# Patient Record
Sex: FEMALE | Race: OTHER | NOT HISPANIC OR LATINO
[De-identification: names, ages, dates, MRNs, and addresses within clinical notes are randomized per-mention and may not be internally consistent; named-entity substitution may affect disease eponyms.]

---

## 2020-08-25 ENCOUNTER — TRANSCRIPTION ENCOUNTER (OUTPATIENT)
Age: 55
End: 2020-08-25

## 2020-08-27 ENCOUNTER — APPOINTMENT (OUTPATIENT)
Dept: INTERNAL MEDICINE | Facility: CLINIC | Age: 55
End: 2020-08-27
Payer: MEDICAID

## 2020-08-27 ENCOUNTER — NON-APPOINTMENT (OUTPATIENT)
Age: 55
End: 2020-08-27

## 2020-08-27 ENCOUNTER — LABORATORY RESULT (OUTPATIENT)
Age: 55
End: 2020-08-27

## 2020-08-27 DIAGNOSIS — Z82.3 FAMILY HISTORY OF STROKE: ICD-10-CM

## 2020-08-27 DIAGNOSIS — Z86.69 PERSONAL HISTORY OF OTHER DISEASES OF THE NERVOUS SYSTEM AND SENSE ORGANS: ICD-10-CM

## 2020-08-27 PROCEDURE — 99386 PREV VISIT NEW AGE 40-64: CPT | Mod: 25

## 2020-08-27 PROCEDURE — 99203 OFFICE O/P NEW LOW 30 MIN: CPT | Mod: 25

## 2020-08-27 PROCEDURE — 36415 COLL VENOUS BLD VENIPUNCTURE: CPT

## 2020-08-27 PROCEDURE — 93000 ELECTROCARDIOGRAM COMPLETE: CPT

## 2020-08-30 PROBLEM — Z82.3 FAMILY HISTORY OF CEREBROVASCULAR ACCIDENT (CVA): Status: ACTIVE | Noted: 2020-08-30

## 2020-08-30 PROBLEM — Z86.69 HISTORY OF CARPAL TUNNEL SYNDROME: Status: RESOLVED | Noted: 2020-08-30 | Resolved: 2020-08-30

## 2020-08-30 NOTE — ASSESSMENT
[FreeTextEntry1] : Blood work done at the office, advised to see a gastroenterologist for colonoscopy mammogram ordered physical therapy and diclofenac gel ordered for lateral epicondyle lightest. No significant neurologic deficit.

## 2020-08-30 NOTE — PHYSICAL EXAM
[Normal Appearance] : normal in appearance [No Masses] : no palpable masses [No Axillary Lymphadenopathy] : no axillary lymphadenopathy [No Nipple Discharge] : no nipple discharge [de-identified] : right elbow pain, worse with flexiom [Normal] : affect was normal and insight and judgment were intact

## 2020-08-30 NOTE — HISTORY OF PRESENT ILLNESS
[FreeTextEntry1] : Patient presents for annual physical [de-identified] : Has history of gastritis, has seen gastroenterologist and head in task. Denies nausea vomiting. Denies any abdominal pain. Also has right elbow pain worse with flexion of the elbow. Denies any neck  pain denies any numbness.

## 2020-08-30 NOTE — HEALTH RISK ASSESSMENT
[Good] : ~his/her~ current health as good [No] : No [0] : 2) Feeling down, depressed, or hopeless: Not at all (0) [FreeTextEntry1] : gastritis, elbow pain [de-identified] : none [] : No [de-identified] : GI [MammogramComments] : ordered [ColonoscopyComments] : ordered

## 2020-09-01 LAB
25(OH)D3 SERPL-MCNC: 22.3 NG/ML
ALBUMIN SERPL ELPH-MCNC: 4.6 G/DL
ALP BLD-CCNC: 96 U/L
ALT SERPL-CCNC: 28 U/L
ANION GAP SERPL CALC-SCNC: 14 MMOL/L
APPEARANCE: CLEAR
APPEARANCE: CLEAR
AST SERPL-CCNC: 28 U/L
BACTERIA: NEGATIVE
BASOPHILS # BLD AUTO: 0.03 K/UL
BASOPHILS NFR BLD AUTO: 0.5 %
BILIRUB SERPL-MCNC: 0.9 MG/DL
BILIRUBIN URINE: NEGATIVE
BILIRUBIN URINE: NEGATIVE
BLOOD URINE: ABNORMAL
BLOOD URINE: ABNORMAL
BUN SERPL-MCNC: 17 MG/DL
CALCIUM SERPL-MCNC: 10.1 MG/DL
CHLORIDE SERPL-SCNC: 102 MMOL/L
CHOLEST SERPL-MCNC: 232 MG/DL
CHOLEST/HDLC SERPL: 3.7 RATIO
CO2 SERPL-SCNC: 24 MMOL/L
COLOR: YELLOW
COLOR: YELLOW
CREAT SERPL-MCNC: 0.64 MG/DL
EOSINOPHIL # BLD AUTO: 0.07 K/UL
EOSINOPHIL NFR BLD AUTO: 1.2 %
ESTIMATED AVERAGE GLUCOSE: 120 MG/DL
GLUCOSE QUALITATIVE U: NEGATIVE
GLUCOSE QUALITATIVE U: NEGATIVE
GLUCOSE SERPL-MCNC: 97 MG/DL
HBA1C MFR BLD HPLC: 5.8 %
HCT VFR BLD CALC: 41.8 %
HDLC SERPL-MCNC: 63 MG/DL
HGB BLD-MCNC: 13.2 G/DL
HYALINE CASTS: 0 /LPF
IMM GRANULOCYTES NFR BLD AUTO: 0.3 %
KETONES URINE: NEGATIVE
KETONES URINE: NEGATIVE
LDLC SERPL CALC-MCNC: 148 MG/DL
LEUKOCYTE ESTERASE URINE: ABNORMAL
LEUKOCYTE ESTERASE URINE: ABNORMAL
LYMPHOCYTES # BLD AUTO: 1.76 K/UL
LYMPHOCYTES NFR BLD AUTO: 29.3 %
MAN DIFF?: NORMAL
MCHC RBC-ENTMCNC: 29.9 PG
MCHC RBC-ENTMCNC: 31.6 GM/DL
MCV RBC AUTO: 94.8 FL
MICROSCOPIC-UA: NORMAL
MONOCYTES # BLD AUTO: 0.35 K/UL
MONOCYTES NFR BLD AUTO: 5.8 %
NEUTROPHILS # BLD AUTO: 3.77 K/UL
NEUTROPHILS NFR BLD AUTO: 62.9 %
NITRITE URINE: NEGATIVE
NITRITE URINE: NEGATIVE
PH URINE: 6
PH URINE: 6
PLATELET # BLD AUTO: 262 K/UL
POTASSIUM SERPL-SCNC: 4.1 MMOL/L
PROT SERPL-MCNC: 7 G/DL
PROTEIN URINE: NORMAL
PROTEIN URINE: NORMAL
RBC # BLD: 4.41 M/UL
RBC # FLD: 13.1 %
RED BLOOD CELLS URINE: 12 /HPF
SODIUM SERPL-SCNC: 140 MMOL/L
SPECIFIC GRAVITY URINE: 1.03
SPECIFIC GRAVITY URINE: 1.03
SQUAMOUS EPITHELIAL CELLS: 1 /HPF
TRIGL SERPL-MCNC: 108 MG/DL
TSH SERPL-ACNC: 0.86 UIU/ML
UROBILINOGEN URINE: NORMAL
UROBILINOGEN URINE: NORMAL
WBC # FLD AUTO: 6 K/UL
WHITE BLOOD CELLS URINE: 52 /HPF

## 2020-09-09 ENCOUNTER — LABORATORY RESULT (OUTPATIENT)
Age: 55
End: 2020-09-09

## 2020-09-16 LAB
APPEARANCE: ABNORMAL
APPEARANCE: ABNORMAL
BACTERIA: NEGATIVE
BILIRUBIN URINE: NEGATIVE
BILIRUBIN URINE: NEGATIVE
BLOOD URINE: ABNORMAL
BLOOD URINE: ABNORMAL
COLOR: YELLOW
COLOR: YELLOW
GLUCOSE QUALITATIVE U: NEGATIVE
GLUCOSE QUALITATIVE U: NEGATIVE
HYALINE CASTS: 2 /LPF
KETONES URINE: NEGATIVE
KETONES URINE: NEGATIVE
LEUKOCYTE ESTERASE URINE: ABNORMAL
LEUKOCYTE ESTERASE URINE: ABNORMAL
MICROSCOPIC-UA: NORMAL
NITRITE URINE: NEGATIVE
NITRITE URINE: NEGATIVE
PH URINE: 6
PH URINE: 6
PROTEIN URINE: ABNORMAL
PROTEIN URINE: ABNORMAL
RED BLOOD CELLS URINE: 6 /HPF
SPECIFIC GRAVITY URINE: 1.03
SPECIFIC GRAVITY URINE: 1.03
SQUAMOUS EPITHELIAL CELLS: 3 /HPF
UROBILINOGEN URINE: NORMAL
UROBILINOGEN URINE: NORMAL
WHITE BLOOD CELLS URINE: 30 /HPF

## 2020-10-06 ENCOUNTER — LABORATORY RESULT (OUTPATIENT)
Age: 55
End: 2020-10-06

## 2020-10-14 LAB
APPEARANCE: ABNORMAL
APPEARANCE: ABNORMAL
BACTERIA: ABNORMAL
BILIRUBIN URINE: NEGATIVE
BILIRUBIN URINE: NEGATIVE
BLOOD URINE: ABNORMAL
BLOOD URINE: ABNORMAL
COLOR: YELLOW
COLOR: YELLOW
GLUCOSE QUALITATIVE U: NEGATIVE
GLUCOSE QUALITATIVE U: NEGATIVE
HYALINE CASTS: 1 /LPF
KETONES URINE: NEGATIVE
KETONES URINE: NEGATIVE
LEUKOCYTE ESTERASE URINE: ABNORMAL
LEUKOCYTE ESTERASE URINE: ABNORMAL
MICROSCOPIC-UA: NORMAL
NITRITE URINE: NEGATIVE
NITRITE URINE: NEGATIVE
PH URINE: 5.5
PH URINE: 5.5
PROTEIN URINE: NORMAL
PROTEIN URINE: NORMAL
RED BLOOD CELLS URINE: 6 /HPF
SPECIFIC GRAVITY URINE: 1.03
SPECIFIC GRAVITY URINE: 1.03
SQUAMOUS EPITHELIAL CELLS: 0 /HPF
UROBILINOGEN URINE: NORMAL
UROBILINOGEN URINE: NORMAL
WHITE BLOOD CELLS URINE: 111 /HPF

## 2020-11-18 ENCOUNTER — APPOINTMENT (OUTPATIENT)
Dept: INTERNAL MEDICINE | Facility: CLINIC | Age: 55
End: 2020-11-18
Payer: MEDICAID

## 2020-11-18 VITALS
DIASTOLIC BLOOD PRESSURE: 66 MMHG | BODY MASS INDEX: 29.51 KG/M2 | SYSTOLIC BLOOD PRESSURE: 124 MMHG | OXYGEN SATURATION: 98 % | HEIGHT: 61.81 IN | HEART RATE: 68 BPM | TEMPERATURE: 96.8 F | WEIGHT: 160.37 LBS

## 2020-11-18 DIAGNOSIS — M62.838 OTHER MUSCLE SPASM: ICD-10-CM

## 2020-11-18 PROCEDURE — 99213 OFFICE O/P EST LOW 20 MIN: CPT

## 2020-11-18 RX ORDER — DICLOFENAC SODIUM 10 MG/G
1 GEL TOPICAL
Qty: 1 | Refills: 3 | Status: DISCONTINUED | COMMUNITY
Start: 2020-08-27 | End: 2020-11-18

## 2020-11-22 NOTE — HISTORY OF PRESENT ILLNESS
[FreeTextEntry8] : Patient presents to the office, has right shoulder and neck pain. Pain travels to the anterior shoulder, denies any weakness, numbness, headache. Worse with movement.

## 2020-11-22 NOTE — PHYSICAL EXAM
[Normal] : normal gait, coordination grossly intact, no focal deficits and deep tendon reflexes were 2+ and symmetric [de-identified] : restricted ROM of the right shoulder, neck paraspinal muscle spasm

## 2020-12-23 ENCOUNTER — LABORATORY RESULT (OUTPATIENT)
Age: 55
End: 2020-12-23

## 2020-12-23 ENCOUNTER — APPOINTMENT (OUTPATIENT)
Dept: INTERNAL MEDICINE | Facility: CLINIC | Age: 55
End: 2020-12-23
Payer: MEDICAID

## 2020-12-23 VITALS
OXYGEN SATURATION: 96 % | HEART RATE: 72 BPM | RESPIRATION RATE: 14 BRPM | TEMPERATURE: 97.4 F | DIASTOLIC BLOOD PRESSURE: 86 MMHG | SYSTOLIC BLOOD PRESSURE: 124 MMHG

## 2020-12-23 VITALS
BODY MASS INDEX: 29.02 KG/M2 | SYSTOLIC BLOOD PRESSURE: 105 MMHG | TEMPERATURE: 98.4 F | OXYGEN SATURATION: 98 % | WEIGHT: 157.71 LBS | DIASTOLIC BLOOD PRESSURE: 62 MMHG | HEIGHT: 61.81 IN | HEART RATE: 65 BPM

## 2020-12-23 DIAGNOSIS — M77.11 LATERAL EPICONDYLITIS, RIGHT ELBOW: ICD-10-CM

## 2020-12-23 DIAGNOSIS — R31.29 OTHER MICROSCOPIC HEMATURIA: ICD-10-CM

## 2020-12-23 DIAGNOSIS — R10.13 EPIGASTRIC PAIN: ICD-10-CM

## 2020-12-23 PROCEDURE — 36415 COLL VENOUS BLD VENIPUNCTURE: CPT

## 2020-12-23 PROCEDURE — 99072 ADDL SUPL MATRL&STAF TM PHE: CPT

## 2020-12-23 PROCEDURE — 99214 OFFICE O/P EST MOD 30 MIN: CPT | Mod: 25

## 2020-12-23 RX ORDER — CEFUROXIME AXETIL 500 MG/1
500 TABLET ORAL
Qty: 14 | Refills: 0 | Status: DISCONTINUED | COMMUNITY
Start: 2020-09-16 | End: 2020-12-23

## 2020-12-23 NOTE — HISTORY OF PRESENT ILLNESS
[FreeTextEntry1] : Patient presents for follow-up of chronic disease management [de-identified] : Feels well however does have some epigastric pain advised heartburn omeprazole helps his symptoms, denies any dysphagia odynophagia melena nausea vomiting decreased appetite.  Denies any family history of gastric cancer.  Been attempting to watch diet has not gone for mammogram denies any urinary urgency flank pain gross hematuria.

## 2020-12-23 NOTE — PHYSICAL EXAM
[Normal Appearance] : normal in appearance [No Masses] : no palpable masses [No Nipple Discharge] : no nipple discharge [No Axillary Lymphadenopathy] : no axillary lymphadenopathy [Normal] : normal gait, coordination grossly intact, no focal deficits and deep tendon reflexes were 2+ and symmetric

## 2020-12-23 NOTE — ASSESSMENT
[FreeTextEntry1] : Advised diclofenac gel for lateral epicondylitis, check lipid panel for dyspepsia advised to see gastroenterologist advised to follow-up check UA and culture for microscopic hematuria.

## 2020-12-30 LAB
25(OH)D3 SERPL-MCNC: 20.1 NG/ML
ALBUMIN SERPL ELPH-MCNC: 4.5 G/DL
ALP BLD-CCNC: 107 U/L
ALT SERPL-CCNC: 24 U/L
ANION GAP SERPL CALC-SCNC: 13 MMOL/L
APPEARANCE: CLEAR
APPEARANCE: CLEAR
AST SERPL-CCNC: 26 U/L
BACTERIA: ABNORMAL
BASOPHILS # BLD AUTO: 0.04 K/UL
BASOPHILS NFR BLD AUTO: 0.8 %
BILIRUB SERPL-MCNC: 0.8 MG/DL
BILIRUBIN URINE: NEGATIVE
BILIRUBIN URINE: NEGATIVE
BLOOD URINE: ABNORMAL
BLOOD URINE: ABNORMAL
BUN SERPL-MCNC: 16 MG/DL
CALCIUM SERPL-MCNC: 9.7 MG/DL
CHLORIDE SERPL-SCNC: 104 MMOL/L
CHOLEST SERPL-MCNC: 237 MG/DL
CO2 SERPL-SCNC: 24 MMOL/L
COLOR: YELLOW
COLOR: YELLOW
CREAT SERPL-MCNC: 0.8 MG/DL
EOSINOPHIL # BLD AUTO: 0.05 K/UL
EOSINOPHIL NFR BLD AUTO: 1 %
ESTIMATED AVERAGE GLUCOSE: 128 MG/DL
GLUCOSE QUALITATIVE U: NEGATIVE
GLUCOSE QUALITATIVE U: NEGATIVE
GLUCOSE SERPL-MCNC: 94 MG/DL
HBA1C MFR BLD HPLC: 6.1 %
HCT VFR BLD CALC: 41.5 %
HDLC SERPL-MCNC: 60 MG/DL
HGB BLD-MCNC: 12.8 G/DL
HYALINE CASTS: 2 /LPF
IMM GRANULOCYTES NFR BLD AUTO: 0.2 %
KETONES URINE: NEGATIVE
KETONES URINE: NEGATIVE
LDLC SERPL CALC-MCNC: 155 MG/DL
LEUKOCYTE ESTERASE URINE: ABNORMAL
LEUKOCYTE ESTERASE URINE: ABNORMAL
LYMPHOCYTES # BLD AUTO: 1.7 K/UL
LYMPHOCYTES NFR BLD AUTO: 35.5 %
MAN DIFF?: NORMAL
MCHC RBC-ENTMCNC: 29.8 PG
MCHC RBC-ENTMCNC: 30.8 GM/DL
MCV RBC AUTO: 96.5 FL
MICROSCOPIC-UA: NORMAL
MONOCYTES # BLD AUTO: 0.29 K/UL
MONOCYTES NFR BLD AUTO: 6.1 %
NEUTROPHILS # BLD AUTO: 2.7 K/UL
NEUTROPHILS NFR BLD AUTO: 56.4 %
NITRITE URINE: POSITIVE
NITRITE URINE: POSITIVE
NONHDLC SERPL-MCNC: 177 MG/DL
PH URINE: 5.5
PH URINE: 5.5
PLATELET # BLD AUTO: 295 K/UL
POTASSIUM SERPL-SCNC: 4.2 MMOL/L
PROT SERPL-MCNC: 7.5 G/DL
PROTEIN URINE: NORMAL
PROTEIN URINE: NORMAL
RBC # BLD: 4.3 M/UL
RBC # FLD: 13.5 %
RED BLOOD CELLS URINE: 2 /HPF
SODIUM SERPL-SCNC: 141 MMOL/L
SPECIFIC GRAVITY URINE: 1.02
SPECIFIC GRAVITY URINE: 1.02
SQUAMOUS EPITHELIAL CELLS: 3 /HPF
TRIGL SERPL-MCNC: 109 MG/DL
TSH SERPL-ACNC: 0.52 UIU/ML
UROBILINOGEN URINE: NORMAL
UROBILINOGEN URINE: NORMAL
WBC # FLD AUTO: 4.79 K/UL
WHITE BLOOD CELLS URINE: 15 /HPF

## 2020-12-31 ENCOUNTER — NON-APPOINTMENT (OUTPATIENT)
Age: 55
End: 2020-12-31

## 2021-02-16 ENCOUNTER — EMERGENCY (EMERGENCY)
Facility: HOSPITAL | Age: 56
LOS: 1 days | Discharge: HOME | End: 2021-02-16
Attending: EMERGENCY MEDICINE
Payer: MEDICAID

## 2021-02-16 VITALS
HEART RATE: 82 BPM | SYSTOLIC BLOOD PRESSURE: 138 MMHG | RESPIRATION RATE: 16 BRPM | TEMPERATURE: 99 F | DIASTOLIC BLOOD PRESSURE: 69 MMHG | OXYGEN SATURATION: 98 % | HEIGHT: 62 IN | WEIGHT: 162.92 LBS

## 2021-02-16 PROCEDURE — 99283 EMERGENCY DEPT VISIT LOW MDM: CPT

## 2021-02-16 RX ORDER — CYCLOBENZAPRINE HYDROCHLORIDE 10 MG/1
1 TABLET, FILM COATED ORAL
Qty: 15 | Refills: 0
Start: 2021-02-16 | End: 2021-02-20

## 2021-02-16 RX ORDER — IBUPROFEN 200 MG
600 TABLET ORAL ONCE
Refills: 0 | Status: COMPLETED | OUTPATIENT
Start: 2021-02-16 | End: 2021-02-16

## 2021-02-16 RX ORDER — ACETAMINOPHEN 500 MG
975 TABLET ORAL ONCE
Refills: 0 | Status: COMPLETED | OUTPATIENT
Start: 2021-02-16 | End: 2021-02-16

## 2021-02-16 RX ORDER — LIDOCAINE 4 G/100G
1 CREAM TOPICAL ONCE
Refills: 0 | Status: COMPLETED | OUTPATIENT
Start: 2021-02-16 | End: 2021-02-16

## 2021-02-16 RX ADMIN — Medication 975 MILLIGRAM(S): at 19:27

## 2021-02-16 RX ADMIN — Medication 600 MILLIGRAM(S): at 19:27

## 2021-02-16 NOTE — ED PROVIDER NOTE - CLINICAL SUMMARY MEDICAL DECISION MAKING FREE TEXT BOX
Exam c/w MSK pain 2/2 cervical strain. No mid-line CS TTP and no trauma. Supportive care discussed using . Cervical collar provided for comfort.

## 2021-02-16 NOTE — ED PROVIDER NOTE - RAPID ASSESSMENT
55 F presents with nontraumatic neck pain x5 days radiating to back worsening today. Pt is unable to move neck or rotate it. States unable to lift arm up. Denies headache, or fever.     Scribe Statement: I, Pam Kelley, attest that this documentation has been prepared under the direction and in the presence of Genaro Springer (PA) 55 F presents with nontraumatic R neck pain x5 days. Thinks she slept on it wrong because she woke up with the pain. [place] most in R side of neck and radiates to R upper back and shoulder, worsened w/ movement of arm. Able to rotate neck but reports pain when doing so. Denies headache, fever, numbness/tingling, weakness in arm, trauma/injury.    Scribe Statement: I, Pam Kelley, attest that this documentation has been prepared under the direction and in the presence of Genaro Springer (PA)    Genaro Springer PA-C: The scribe's documentation has been prepared under my direction and personally reviewed by me in its entirety. I confirm that the note above accurately reflects history obtained.   Patient was rapidly assessed via telemedicine encounter; a limited history was obtained. The patient will be seen and further examined and worked up in the main ED and their care will be completed by the main ED team. Receiving team will follow up on labs, analgesia, any clinical imaging, and perform reassessment and disposition of the patient as clinically indicated. All decisions regarding the progression of care will be made at their discretion.

## 2021-02-16 NOTE — ED PROVIDER NOTE - NSFOLLOWUPINSTRUCTIONS_ED_ALL_ED_FT
Follow-up with your primary care doctor in 3-7 days. Call office for appointment.    Take Extra Strength Tylenol over the counter per label instructions as needed for pain.    Take Flexeril (muscle relaxer) per prescription.    Wear neck brace as needed for comfort.    Apply warm pack to neck as needed for pain.    Gently stretch neck and arm.    Seek medical attention if your symptoms worsen or you have any concerns.  ------------------------------------------------------------------------------------------------------------------------  [employer] :    Jama un seguimiento con bobby médico de atención primaria en 3-7 días. Llame a la oficina para hacer jackie bonnie.    Keller Tylenol Extra Strength de venta shakila según las instrucciones de la etiqueta según sea necesario para el dolor.    Keller Flexeril (relajante muscular) según prescripción médica.    Use un collarín según sea necesario para mayor comodidad.    Aplique jackie compresa caliente en el cosme según sea necesario para el dolor.    Estire suavemente el cosme y el brazo.    Busque atención médica si ramakrishna síntomas empeoran o si tiene alguna inquietud.

## 2021-02-16 NOTE — ED PROVIDER NOTE - PATIENT PORTAL LINK FT
You can access the FollowMyHealth Patient Portal offered by [facility] by registering at the following website: [email or web address]. By joining [facility]’s FollowMyHealth portal, you will also be able to view your health information using other applications (apps) compatible with our system.

## 2021-02-16 NOTE — ED PROVIDER NOTE - OBJECTIVE STATEMENT
55F PMH of gastritis, HL, carpel tunnel syndrome and RUE tendonitis p/w a few days gradual onset worsening right side neck pain radiating into RUE, worse with turning head and lifting up arm. No medication or intervention PTA. No HA, no CP, no trauma. Pt is right handed. Currently not employed.

## 2021-02-16 NOTE — ED PROVIDER NOTE - NS ED ROS FT
Subjective   Patient ID: Merrill Wright is a 54year old female. Chief Complaint   Patient presents with   [facility] F/U     [facility]  [place]  Â   ADMISSION DATE:   11/29/2018  Â   DISCHARGE DATE:   12/2/2018  Â LE weakness with MS          Patient's medications, allergies, past medical, surgical, social and family histories were reviewed and updated as appropriate. Review of Systems   All other systems reviewed and are negative.       Objective   Physical Exam    Assessment   Problem List Items Addressed This Visit        Nervous    Multiple sclerosis (CMS/[facility]) - Primary     Low carb diet discussed            Musculoskeletal    Facet arthritis of lumbar region (CMS/[facility])     PT and Neuro  Neurosurgery if recommended by neurology CONSTITUTIONAL: Well appearing, well nourished, awake, alert, oriented to person, place, time/situation and in no apparent distress  ENMT: Airway patent  EYES: Clear bilaterally  CARDIAC: Normal rate, regular rhythm.  RESPIRATORY: Breath sounds clear and equal bilaterally.  ABDOMEN: Abdomen soft, non-tender, no guarding  MUSCULOSKELETAL: Spine appears normal, no deformities, No mid-line CS TTP, +TTP right lateral neck base to mid-shoulder, +5/5 b/l  strength, pain worse with elevation of arm active above head but FROM, radial pulse +2  NEUROLOGIC: CN II-XII grossly intact, moves all extremities without lateralization  SKIN: Exposed skin normal color for race, warm, dry and intact

## 2021-02-16 NOTE — ED PROVIDER NOTE - DISPOSITION TYPE
Chief Complaint   Patient presents with     RECHECK     3 month follow up foot care            Allergies   Allergen Reactions     Penicillins Rash     Unasyn Rash     No evidence SJS, but very uncomfortable and precipitated multiple provider visits. Would not use penicillins again if other options available.          Subjective: Supriya is a 69 year old female who presents to the clinic today for a diabetic foot exam and management. She relates that she has No new foot complaints.      Objective    Hemoglobin A1C   Date Value Ref Range Status   06/22/2018 6.0 (H) 0 - 5.6 % Final     Comment:     Normal <5.7% Prediabetes 5.7-6.4%  Diabetes 6.5% or higher - adopted from ADA   consensus guidelines.           Non-palpable DP and PT pulses BL.   Equinus noted BL. Pes planus with rigid toe deformities noted to lesser digits on the right. Left AKA noted.   Nails are thickened, discolored, elongated, with subungual debris consistent with onychomycosis.    Scabbed venous ulcer on the anterior right leg. No s/s of infection.  No open lesion associated. No bleeding. No pain to the area. Small scar noted to the plantar right 1st interspace. No s/s of infection. No hyperkeratosis. Irritation erythema to the dorsal right foot.      Assessment: DM2 with left AKA and neuropathy - presenting for a diabetic foot exam.   Onychomycosis.      Plan:   - Pt seen and evaluated  - Nails debrided x 5.  - Cont compression socks.  - See again in 3 months.     DISCHARGE

## 2021-02-16 NOTE — ED PROVIDER NOTE - PHYSICAL EXAMINATION
CONSTITUTIONAL: Well appearing, well nourished, awake, alert, oriented to person, place, time/situation and in no apparent distress  ENMT: Airway patent  EYES: Clear bilaterally  CARDIAC: Normal rate, regular rhythm.  RESPIRATORY: Breath sounds clear and equal bilaterally.  ABDOMEN: Abdomen soft, non-tender, no guarding  MUSCULOSKELETAL: Spine appears normal, no deformities, No mid-line CS TTP, +TTP right lateral neck base to mid-shoulder, +5/5 b/l  strength, pain worse with elevation of arm active above head but FROM, radial pulse +2  NEUROLOGIC: CN II-XII grossly intact, moves all extremities without lateralization  SKIN: Exposed skin normal color for race, warm, dry and intact

## 2021-02-17 VITALS
OXYGEN SATURATION: 96 % | RESPIRATION RATE: 16 BRPM | SYSTOLIC BLOOD PRESSURE: 108 MMHG | HEART RATE: 68 BPM | DIASTOLIC BLOOD PRESSURE: 68 MMHG | TEMPERATURE: 98 F

## 2021-02-17 RX ADMIN — LIDOCAINE 1 PATCH: 4 CREAM TOPICAL at 00:05

## 2021-02-17 NOTE — ED ADULT NURSE NOTE - OBJECTIVE STATEMENT
55y female from triage complaining of neck pain, more right sided than left, able to rotate head and move all extremities, pt thinks they slept on it wrong, denies any other symptoms, breathing even and unlabored, bed in lowest position, comfort and safety provided.

## 2021-02-18 ENCOUNTER — APPOINTMENT (OUTPATIENT)
Dept: INTERNAL MEDICINE | Facility: CLINIC | Age: 56
End: 2021-02-18
Payer: MEDICAID

## 2021-02-18 VITALS
TEMPERATURE: 98.4 F | OXYGEN SATURATION: 98 % | HEIGHT: 61.02 IN | BODY MASS INDEX: 30.88 KG/M2 | HEART RATE: 72 BPM | DIASTOLIC BLOOD PRESSURE: 72 MMHG | SYSTOLIC BLOOD PRESSURE: 114 MMHG | WEIGHT: 163.55 LBS

## 2021-02-18 DIAGNOSIS — M25.519 CERVICALGIA: ICD-10-CM

## 2021-02-18 DIAGNOSIS — M54.2 CERVICALGIA: ICD-10-CM

## 2021-02-18 PROCEDURE — 99213 OFFICE O/P EST LOW 20 MIN: CPT

## 2021-02-18 PROCEDURE — 99072 ADDL SUPL MATRL&STAF TM PHE: CPT

## 2021-02-18 RX ORDER — DICLOFENAC SODIUM 1% 10 MG/G
1 GEL TOPICAL DAILY
Qty: 1 | Refills: 0 | Status: ACTIVE | COMMUNITY
Start: 2020-11-18 | End: 1900-01-01

## 2021-02-18 NOTE — HISTORY OF PRESENT ILLNESS
[de-identified] : Symptoms began a few days ago, approximately 2 days ago patient had woken up with stiffness in the right neck.  Had difficulty moving neck went to the emergency room and was diagnosed with muscle spasm.  Has pain in the right shoulder, radiates from the neck down to the right shoulder right elbow.  Has difficulty moving shoulder.  Denies any weakness of the hands denies dropping any objects numbness.  Denies any headaches did fall on outstretched hand in the right arm in August, improved with physical therapy however has not worsened.  Pain is described as sharp in nature.  [FreeTextEntry1] : Patient presents to the office for neck and shoulder pain

## 2021-02-18 NOTE — PHYSICAL EXAM
[Normal] : no posterior cervical lymphadenopathy and no anterior cervical lymphadenopathy [No Focal Deficits] : no focal deficits [Normal Gait] : normal gait [Deep Tendon Reflexes (DTR)] : deep tendon reflexes were 2+ and symmetric [Plantar Reflex Right Only] : absent on the right [de-identified] : Tenderness in the posterior neck anterior and lateral shoulder tenderness to palpation restricted range of motion paraspinal muscle spasm, restricted abduction flexion internal and external rotation.

## 2021-02-18 NOTE — ASSESSMENT
[FreeTextEntry1] : Symptoms may be secondary to neck arthritis muscle spasm radiculopathy versus tendinitis to treat with high-dose prednisone for 1 week if no improvement to see orthopedist.  No signs of any neurologic damage on exam.

## 2021-03-27 ENCOUNTER — APPOINTMENT (OUTPATIENT)
Dept: RADIOLOGY | Facility: CLINIC | Age: 56
End: 2021-03-27
Payer: MEDICAID

## 2021-03-27 ENCOUNTER — OUTPATIENT (OUTPATIENT)
Dept: OUTPATIENT SERVICES | Facility: HOSPITAL | Age: 56
LOS: 1 days | End: 2021-03-27
Payer: MEDICAID

## 2021-03-27 ENCOUNTER — RESULT REVIEW (OUTPATIENT)
Age: 56
End: 2021-03-27

## 2021-03-27 DIAGNOSIS — M54.2 CERVICALGIA: ICD-10-CM

## 2021-03-27 PROCEDURE — 73030 X-RAY EXAM OF SHOULDER: CPT | Mod: 26,RT

## 2021-03-27 PROCEDURE — 72050 X-RAY EXAM NECK SPINE 4/5VWS: CPT

## 2021-03-27 PROCEDURE — 73030 X-RAY EXAM OF SHOULDER: CPT

## 2021-03-27 PROCEDURE — 72050 X-RAY EXAM NECK SPINE 4/5VWS: CPT | Mod: 26

## 2021-03-30 ENCOUNTER — NON-APPOINTMENT (OUTPATIENT)
Age: 56
End: 2021-03-30

## 2021-04-01 ENCOUNTER — APPOINTMENT (OUTPATIENT)
Dept: INTERNAL MEDICINE | Facility: CLINIC | Age: 56
End: 2021-04-01
Payer: MEDICAID

## 2021-04-01 VITALS
HEART RATE: 71 BPM | OXYGEN SATURATION: 96 % | WEIGHT: 166.54 LBS | DIASTOLIC BLOOD PRESSURE: 62 MMHG | BODY MASS INDEX: 31.04 KG/M2 | TEMPERATURE: 99.3 F | HEIGHT: 61.42 IN | SYSTOLIC BLOOD PRESSURE: 100 MMHG

## 2021-04-01 DIAGNOSIS — M25.511 PAIN IN RIGHT SHOULDER: ICD-10-CM

## 2021-04-01 DIAGNOSIS — M79.18 MYALGIA, OTHER SITE: ICD-10-CM

## 2021-04-01 DIAGNOSIS — G89.29 PAIN IN RIGHT SHOULDER: ICD-10-CM

## 2021-04-01 DIAGNOSIS — R79.89 OTHER SPECIFIED ABNORMAL FINDINGS OF BLOOD CHEMISTRY: ICD-10-CM

## 2021-04-01 PROCEDURE — 99072 ADDL SUPL MATRL&STAF TM PHE: CPT

## 2021-04-01 PROCEDURE — 99214 OFFICE O/P EST MOD 30 MIN: CPT

## 2021-04-01 RX ORDER — TIZANIDINE 4 MG/1
4 TABLET ORAL
Qty: 30 | Refills: 1 | Status: ACTIVE | COMMUNITY
Start: 2021-04-01 | End: 1900-01-01

## 2021-04-01 RX ORDER — ERGOCALCIFEROL (VITAMIN D2) 50 MCG
50 MCG CAPSULE ORAL
Qty: 90 | Refills: 3 | Status: ACTIVE | COMMUNITY
Start: 2021-04-01 | End: 1900-01-01

## 2021-04-01 NOTE — HISTORY OF PRESENT ILLNESS
[FreeTextEntry1] : Patient presents to the office for follow-up of shoulder and neck pain [de-identified] : Continues to have neck pain and shoulder pain physical therapy has not alleviated symptoms.  Has difficulty with movement of shoulder also has some neck pain symptoms to radiate down to the right deltoid.  Denies any weakness of the hands.  Denies any numbness of the hands.

## 2021-04-01 NOTE — PHYSICAL EXAM
[Normal] : normal gait, coordination grossly intact, no focal deficits and deep tendon reflexes were 2+ and symmetric [de-identified] : Limited internal and external rotation, limited flexion of the shoulder myofascial tenderness over the right posterior shoulder.  And paraspinal muscle spasm.

## 2021-04-01 NOTE — ASSESSMENT
[FreeTextEntry1] : Symptoms may be secondary to myofascial pain patient does have restricted range of motion suspect an element of neck involved as well neurologic exam within normal limits orthopedic surgery referral placed for further recommendations, also advised to get MRI of the shoulder to rule out any rotator cuff pathology, patient has not benefited from physical therapy

## 2021-04-23 ENCOUNTER — APPOINTMENT (OUTPATIENT)
Dept: MRI IMAGING | Facility: CLINIC | Age: 56
End: 2021-04-23
Payer: MEDICAID

## 2021-04-23 ENCOUNTER — OUTPATIENT (OUTPATIENT)
Dept: OUTPATIENT SERVICES | Facility: HOSPITAL | Age: 56
LOS: 1 days | End: 2021-04-23
Payer: MEDICAID

## 2021-04-23 DIAGNOSIS — M25.511 PAIN IN RIGHT SHOULDER: ICD-10-CM

## 2021-04-23 DIAGNOSIS — M75.80 OTHER SHOULDER LESIONS, UNSPECIFIED SHOULDER: ICD-10-CM

## 2021-04-23 PROCEDURE — 73221 MRI JOINT UPR EXTREM W/O DYE: CPT | Mod: 26,RT

## 2021-04-23 PROCEDURE — 73221 MRI JOINT UPR EXTREM W/O DYE: CPT

## 2021-06-08 ENCOUNTER — APPOINTMENT (OUTPATIENT)
Dept: INTERNAL MEDICINE | Facility: CLINIC | Age: 56
End: 2021-06-08
Payer: MEDICAID

## 2021-06-08 VITALS
BODY MASS INDEX: 31.47 KG/M2 | DIASTOLIC BLOOD PRESSURE: 67 MMHG | WEIGHT: 168.86 LBS | SYSTOLIC BLOOD PRESSURE: 110 MMHG | HEIGHT: 61.54 IN | HEART RATE: 73 BPM | TEMPERATURE: 97.8 F | OXYGEN SATURATION: 98 %

## 2021-06-08 PROCEDURE — 99213 OFFICE O/P EST LOW 20 MIN: CPT

## 2021-06-11 NOTE — ASSESSMENT
[FreeTextEntry1] : Your MRI report which showed tendinosis patient possibly has an element of frozen shoulder as well NSAID given advised to follow-up with orthopedic surgeon as well.  Discussed that would likely benefit from physical therapy.  Could possibly be an element of radiculopathy as well.

## 2021-06-11 NOTE — PHYSICAL EXAM
[Normal] : normal sclera/conjunctiva, pupils are equal, round and reactive to light and extraocular movements are intact [de-identified] : Tenderness to palpation of the subacromial bursa and also posterior shoulder.  Range of motion passive and active limited.

## 2021-06-11 NOTE — HISTORY OF PRESENT ILLNESS
[FreeTextEntry1] : Patient presents to the office for continuation of shoulder pain [de-identified] : Patient continues to have shoulder pain has not gone to physical therapy, has difficulty raising shoulder above the head denies any neck pain.  Denies any weakness of the shoulder.

## 2021-06-15 ENCOUNTER — APPOINTMENT (OUTPATIENT)
Dept: ORTHOPEDIC SURGERY | Facility: CLINIC | Age: 56
End: 2021-06-15
Payer: MEDICAID

## 2021-06-15 VITALS
SYSTOLIC BLOOD PRESSURE: 121 MMHG | HEIGHT: 60 IN | HEART RATE: 71 BPM | WEIGHT: 172 LBS | BODY MASS INDEX: 33.77 KG/M2 | DIASTOLIC BLOOD PRESSURE: 80 MMHG

## 2021-06-15 PROCEDURE — 20610 DRAIN/INJ JOINT/BURSA W/O US: CPT | Mod: RT

## 2021-06-15 PROCEDURE — 99203 OFFICE O/P NEW LOW 30 MIN: CPT | Mod: 25

## 2021-06-15 PROCEDURE — 73030 X-RAY EXAM OF SHOULDER: CPT | Mod: RT

## 2021-06-16 NOTE — PROCEDURE
[de-identified] : Injection: Right shoulder (Subacromial).\par Indication: Adhesive capsulitis  . \par \par A discussion was had with the patient regarding this procedure and all questions were answered. All risks, benefits and alternatives were discussed. These included but were not limited to bleeding, infection, and allergic reaction. Alcohol was used to clean the skin, and betadine was used to sterilize and prep the area in the posterior aspect of the right shoulder. Ethyl chloride spray was then used as a topical anesthetic. A 21-gauge needle was used to inject 4cc of 1% lidocaine and 1cc of 40mg/ml methylprednisolone into the right subacromial space. A sterile bandage was then applied. The patient tolerated the procedure well and there were no complications.

## 2021-06-16 NOTE — PHYSICAL EXAM
[de-identified] : Oriented to time, place, person\par Mood: Normal\par Affect: Normal\par Appearance: Healthy, well appearing, no acute distress.\par Gait: Normal\par Assistive Devices: None\par \par Right shoulder exam:\par \par Inspection: No malalignment, No defects, No atrophy\par Skin: No masses, No lesions\par Neck: Negative Spurling, full ROM, no pain with ROM\par AROM: FF to 120, abduction to 80, ER to 20, IR to lower lumbar\par Painful arc ROM: Pain FF pasive=activ \par Tenderness: No bicipital tenderness, no tenderness to greater tuberosity/RTC insertion, no anterior shoulder/lesser tuberosity tenderness\par Strength: 5/5 ER, 5/5 IR in adduction, 5/5 supraspinatus testing, negative Mongo's test\par AC joint: No TTP/pain with cross arm testing\par Biceps: Speed Negative, Yergason Negative \par Impingement test: Negative Greene, Negative Neer\par Vasc: 2+ radial pulse \par Stability: Stable \par Neuro: AIN, PIN, Ulnar nerve intact to motor\par Sensation: Intact to light touch throughout  [de-identified] : Images were reviewed from NH dated 3.27.2021.\par \par 3 views of right shoulder were obtained, that show no acute fracture or dislocation. There is no glenohumeral and no AC joint degenerative change seen. Type II acromion. There is no significant malalignment. No significant other obvious osseous abnormality, otherwise unremarkable. \par \par MRI right shoulder dated 4.27.2021 shows mild to moderate insertional supraspinatus and infraspinatus tendinosis with no high-grade RTC injury.  No significant internal derangement.

## 2021-06-16 NOTE — ADDENDUM
[FreeTextEntry1] : This note was written by Ani Montano on 06/15/2021 acting solely as a scribe for Dr. Rik Peace.\par \par All medical record entries made by the Scribe were at my, Dr. Rik Peace, direction and personally dictated by me on 06/15/2021. I have personally reviewed the chart and agree that the record accurately reflects my personal performance of the history, physical exam, assessment and plan.

## 2021-06-16 NOTE — HISTORY OF PRESENT ILLNESS
[de-identified] : 55 year old RHD female presents today with right shoulder pain x 1 year. No injury reported. She was referred for PT through PMD completed in February 2021 and it was not helpful. The pain is constant worse with overhead movements and internal rotation. Localizes pain to the lateral shoulder. Reports loss of motion. Diclofenac gel is not helpful. Denies numbness or tingling. She has obtained MRI at [facility].\par \par The patient's past medical history, past surgical history, medications and allergies were reviewed by me today with the patient and documented accordingly. In addition, the patient's family and social history, which were noncontributory to this visit, were reviewed also.

## 2021-06-16 NOTE — DISCUSSION/SUMMARY
[de-identified] : 54 y/o female with right shoulder adhesive capsulitis. \par \par The patient presents today with a painful gradual loss of active and passive glenohumeral motion. This is likely due to progressive fibrosis and contracture of the glenohumeral joint. This is referred to as adhesive capsulitis or "frozen shoulder". I discussed that this occurs in approximately 2-5% of the population, and can affects the contralateral shoulder in approximately 20-30% of patients. This can be due to a primary idiopathic condition, or because of a secondary underlying structural condition. I discussed the 4 stages of adhesive capsulitis. Stage I refers to an inflammatory condition that causes night pain/discomfort with associated full passive ROM. Stage II results in severe pain and restriction of motion during a hyper-inflammatory synovitis. Stage III results in profound tethered stiffness throughout range of motion without acute inflammatory changes. Stage IV results in stiffness with minimal residual pain and dysfunction.I discussed that the natural history of the condition is self-limiting however, this may take up to 12-24 months. Nonoperative treatment includes pharmacological treatment of the inflammation (including NSAID's, intra-articular corticosteroids) and aggressive physical therapy (if tolerated), surgery can address capsular contraction with release/manipulation under anesthesia.\par \par Recommendation: Injection therapy was provided for therapeutic and symptomatic relief. Begin trial of PT, Rx given. NSAIDs/Ice as needed. \par \par Follow up 3 months

## 2021-06-16 NOTE — PATIENT INSTRUCTIONS
[FreeTextEntry1] : This note was written by Ani Montano on 06/15/2021 acting solely as a scribe for Dr. Rik Peace.  All medical record entries made by the Scribe were at my, Dr. Rik Peace, direction and personally dictated by me on 06/15/2021. I have personally reviewed the chart and agree that the record accurately reflects my personal performance of the history, physical exam, assessment and plan.

## 2021-07-02 ENCOUNTER — RX RENEWAL (OUTPATIENT)
Age: 56
End: 2021-07-02

## 2021-07-02 RX ORDER — DICLOFENAC SODIUM 100 MG/1
100 TABLET, FILM COATED, EXTENDED RELEASE ORAL
Qty: 30 | Refills: 0 | Status: ACTIVE | COMMUNITY
Start: 2021-06-08 | End: 1900-01-01

## 2021-08-11 ENCOUNTER — APPOINTMENT (OUTPATIENT)
Dept: ORTHOPEDIC SURGERY | Facility: CLINIC | Age: 56
End: 2021-08-11
Payer: MEDICAID

## 2021-08-11 ENCOUNTER — APPOINTMENT (OUTPATIENT)
Dept: ORTHOPEDIC SURGERY | Facility: CLINIC | Age: 56
End: 2021-08-11

## 2021-08-11 PROCEDURE — 99213 OFFICE O/P EST LOW 20 MIN: CPT

## 2021-08-11 RX ORDER — DICLOFENAC SODIUM 75 MG/1
75 TABLET, DELAYED RELEASE ORAL TWICE DAILY
Qty: 60 | Refills: 0 | Status: ACTIVE | COMMUNITY
Start: 2021-08-11 | End: 1900-01-01

## 2021-08-11 RX ORDER — CYCLOBENZAPRINE HYDROCHLORIDE 5 MG/1
5 TABLET, FILM COATED ORAL
Qty: 30 | Refills: 0 | Status: ACTIVE | COMMUNITY
Start: 2021-08-11 | End: 1900-01-01

## 2021-08-12 NOTE — DISCUSSION/SUMMARY
[de-identified] : 54 y/o female with right shoulder adhesive capsulitis. \par \par The patient presents with follow-up right shoulder adhesive capsulitis. She reports worsening pain and loss of motion to the right shoulder despite trial of PT. PT is aggravating the pain, and she is not tolerating it well.  We discussed discontinuation of physical therapy and maintenance of current motion, as well as anti-inflammatory medications.  We again discussed long-term implications of adhesive capsulitis and continued conservative management.\par \par Recommendation: Discontinue PT.  NSAIDs as needed.  Topicals as needed.  Home exercise program/range of motion as tolerated.\par \par Follow up 3 months

## 2021-08-12 NOTE — ADDENDUM
[FreeTextEntry1] : This note was written by Ani Montano on 08/11/2021 acting solely as a scribe for Dr. Rik Peace.\par \par All medical record entries made by the Scribe were at my, Dr. Rik Peace, direction and personally dictated by me on 08/11/2021. I have personally reviewed the chart and agree that the record accurately reflects my personal performance of the history, physical exam, assessment and plan.

## 2021-08-12 NOTE — HISTORY OF PRESENT ILLNESS
[de-identified] : 55 year old RHD female presents today for follow up of right shoulder adhesive capsulitis. She received a cortisone injection at the last visit which was helpful for 2 weeks. She has been attending PT 2 x per week, but reports difficulty with exercises. No injury reported.  The pain is constant worse with overhead movements and internal rotation. Localizes pain to the lateral shoulder. Reports loss of motion. Diclofenac gel is helpful. Denies numbness or tingling.

## 2021-10-21 ENCOUNTER — APPOINTMENT (OUTPATIENT)
Dept: ORTHOPEDIC SURGERY | Facility: CLINIC | Age: 56
End: 2021-10-21
Payer: MEDICAID

## 2021-10-21 PROCEDURE — 99213 OFFICE O/P EST LOW 20 MIN: CPT

## 2021-10-21 RX ORDER — DICLOFENAC SODIUM 1% 10 MG/G
1 GEL TOPICAL
Qty: 1 | Refills: 1 | Status: ACTIVE | COMMUNITY
Start: 2021-08-11 | End: 1900-01-01

## 2021-10-21 NOTE — HISTORY OF PRESENT ILLNESS
[de-identified] : 55 year old RHD female presents today for follow up of right shoulder adhesive capsulitis. She has been doing exercises on her own. Pain in the shoulder has improved but has developed to her  upper arm and forearm. She is struggling with ADLs.  She received a cortisone injection 6/5/21 No injury reported.  Reports loss of motion. Diclofenac gel is helpful. Denies numbness or tingling.

## 2021-10-21 NOTE — PHYSICAL EXAM
[de-identified] : Oriented to time, place, person\par Mood: Normal\par Affect: Normal\par Appearance: Healthy, well appearing, no acute distress.\par Gait: Normal\par Assistive Devices: None\par \par Right shoulder exam:\par \par Inspection: No malalignment, No defects, No atrophy\par Skin: No masses, No lesions\par Neck: Negative Spurling, full ROM, no pain with ROM\par AROM: FF to 80, abduction to 70, ER to 10, IR to lower lumbar\par Painful arc ROM: Pain FF pasive=active\par Tenderness: No bicipital tenderness, no tenderness to greater tuberosity/RTC insertion, no anterior shoulder/lesser tuberosity tenderness\par Strength: 5/5 ER, 5/5 IR in adduction, 5/5 supraspinatus testing, negative McDowell's test\par AC joint: No TTP/pain with cross arm testing\par Biceps: Speed Negative, Yergason Negative \par Impingement test: Negative Greene, Negative Neer\par Vasc: 2+ radial pulse \par Stability: Stable \par Neuro: AIN, PIN, Ulnar nerve intact to motor\par Sensation: Intact to light touch throughout  [de-identified] : Images were reviewed from NH dated 3.27.2021.\par \par 3 views of right shoulder were obtained, that show no acute fracture or dislocation. There is no glenohumeral and no AC joint degenerative change seen. Type II acromion. There is no significant malalignment. No significant other obvious osseous abnormality, otherwise unremarkable. \par \par MRI right shoulder dated 4.27.2021 shows mild to moderate insertional supraspinatus and infraspinatus tendinosis with no high-grade RTC injury.  No significant internal derangement.

## 2021-10-21 NOTE — ADDENDUM
[FreeTextEntry1] : This note was written by Ani Montano on 10/21/2021 acting solely as a scribe for Dr. Rik Peace.\par \par All medical record entries made by the Scribe were at my, Dr. Rik Peace, direction and personally dictated by me on 10/21/2021. I have personally reviewed the chart and agree that the record accurately reflects my personal performance of the history, physical exam, assessment and plan.

## 2021-12-28 ENCOUNTER — LABORATORY RESULT (OUTPATIENT)
Age: 56
End: 2021-12-28

## 2021-12-28 ENCOUNTER — NON-APPOINTMENT (OUTPATIENT)
Age: 56
End: 2021-12-28

## 2021-12-28 ENCOUNTER — APPOINTMENT (OUTPATIENT)
Dept: INTERNAL MEDICINE | Facility: CLINIC | Age: 56
End: 2021-12-28
Payer: MEDICAID

## 2021-12-28 VITALS
HEART RATE: 68 BPM | BODY MASS INDEX: 31.13 KG/M2 | TEMPERATURE: 96.71 F | OXYGEN SATURATION: 98 % | HEIGHT: 61.02 IN | WEIGHT: 164.89 LBS | DIASTOLIC BLOOD PRESSURE: 68 MMHG | SYSTOLIC BLOOD PRESSURE: 121 MMHG

## 2021-12-28 DIAGNOSIS — Z12.31 ENCOUNTER FOR SCREENING MAMMOGRAM FOR MALIGNANT NEOPLASM OF BREAST: ICD-10-CM

## 2021-12-28 PROCEDURE — 99396 PREV VISIT EST AGE 40-64: CPT | Mod: 25

## 2022-01-02 NOTE — HISTORY OF PRESENT ILLNESS
[FreeTextEntry1] : Patient presents for annual physical [de-identified] : Shoulder pain is improving\par Chest pain chest tightness shortness.\par Does have gastritis, denies any nausea taking PPI\par Denies any melena\par

## 2022-01-02 NOTE — HEALTH RISK ASSESSMENT
[Good] : ~his/her~  mood as  good [FreeTextEntry1] : health maintenance [0] : 2) Feeling down, depressed, or hopeless: Not at all (0) [de-identified] : GI [[id number]] : 0

## 2022-01-03 LAB
25(OH)D3 SERPL-MCNC: 20.3 NG/ML
ALBUMIN SERPL ELPH-MCNC: 4.4 G/DL
ALP BLD-CCNC: 121 U/L
ALT SERPL-CCNC: 27 U/L
ANION GAP SERPL CALC-SCNC: 13 MMOL/L
APPEARANCE: CLEAR
APPEARANCE: CLEAR
AST SERPL-CCNC: 24 U/L
BACTERIA: ABNORMAL
BASOPHILS # BLD AUTO: 0.04 K/UL
BASOPHILS NFR BLD AUTO: 0.7 %
BILIRUB SERPL-MCNC: 0.9 MG/DL
BILIRUBIN URINE: NEGATIVE
BILIRUBIN URINE: NEGATIVE
BLOOD URINE: ABNORMAL
BLOOD URINE: ABNORMAL
BUN SERPL-MCNC: 20 MG/DL
CALCIUM SERPL-MCNC: 9.7 MG/DL
CHLORIDE SERPL-SCNC: 106 MMOL/L
CHOLEST SERPL-MCNC: 175 MG/DL
CO2 SERPL-SCNC: 23 MMOL/L
COLOR: NORMAL
COLOR: NORMAL
CREAT SERPL-MCNC: 0.72 MG/DL
EOSINOPHIL # BLD AUTO: 0.07 K/UL
EOSINOPHIL NFR BLD AUTO: 1.1 %
ESTIMATED AVERAGE GLUCOSE: 134 MG/DL
GLUCOSE QUALITATIVE U: NEGATIVE
GLUCOSE QUALITATIVE U: NEGATIVE
GLUCOSE SERPL-MCNC: 112 MG/DL
HBA1C MFR BLD HPLC: 6.3 %
HCT VFR BLD CALC: 40.8 %
HDLC SERPL-MCNC: 63 MG/DL
HGB BLD-MCNC: 13 G/DL
HYALINE CASTS: 2 /LPF
IMM GRANULOCYTES NFR BLD AUTO: 0.2 %
KETONES URINE: NEGATIVE
KETONES URINE: NEGATIVE
LDLC SERPL CALC-MCNC: 90 MG/DL
LEUKOCYTE ESTERASE URINE: ABNORMAL
LEUKOCYTE ESTERASE URINE: ABNORMAL
LYMPHOCYTES # BLD AUTO: 1.78 K/UL
LYMPHOCYTES NFR BLD AUTO: 29.1 %
MAN DIFF?: NORMAL
MCHC RBC-ENTMCNC: 29.8 PG
MCHC RBC-ENTMCNC: 31.9 GM/DL
MCV RBC AUTO: 93.6 FL
MICROSCOPIC-UA: NORMAL
MONOCYTES # BLD AUTO: 0.38 K/UL
MONOCYTES NFR BLD AUTO: 6.2 %
NEUTROPHILS # BLD AUTO: 3.84 K/UL
NEUTROPHILS NFR BLD AUTO: 62.7 %
NITRITE URINE: NEGATIVE
NITRITE URINE: NEGATIVE
NONHDLC SERPL-MCNC: 112 MG/DL
PH URINE: 6
PH URINE: 6
PLATELET # BLD AUTO: 247 K/UL
POTASSIUM SERPL-SCNC: 4.2 MMOL/L
PROT SERPL-MCNC: 7.1 G/DL
PROTEIN URINE: NORMAL
PROTEIN URINE: NORMAL
RBC # BLD: 4.36 M/UL
RBC # FLD: 13.5 %
RED BLOOD CELLS URINE: 1 /HPF
SODIUM SERPL-SCNC: 142 MMOL/L
SPECIFIC GRAVITY URINE: 1.02
SPECIFIC GRAVITY URINE: 1.02
SQUAMOUS EPITHELIAL CELLS: 1 /HPF
TRIGL SERPL-MCNC: 108 MG/DL
TSH SERPL-ACNC: 1.2 UIU/ML
UROBILINOGEN URINE: NORMAL
UROBILINOGEN URINE: NORMAL
VIT B12 SERPL-MCNC: 652 PG/ML
WBC # FLD AUTO: 6.12 K/UL
WHITE BLOOD CELLS URINE: 40 /HPF

## 2022-01-12 ENCOUNTER — APPOINTMENT (OUTPATIENT)
Dept: VASCULAR SURGERY | Facility: CLINIC | Age: 57
End: 2022-01-12
Payer: MEDICAID

## 2022-01-12 VITALS
WEIGHT: 164 LBS | HEIGHT: 61 IN | BODY MASS INDEX: 30.96 KG/M2 | DIASTOLIC BLOOD PRESSURE: 80 MMHG | SYSTOLIC BLOOD PRESSURE: 125 MMHG | HEART RATE: 74 BPM | TEMPERATURE: 98 F

## 2022-01-12 PROCEDURE — 99203 OFFICE O/P NEW LOW 30 MIN: CPT

## 2022-01-12 PROCEDURE — 93970 EXTREMITY STUDY: CPT

## 2022-01-12 NOTE — REASON FOR VISIT
[Initial Evaluation] : an initial evaluation [[employer] ] : provided by [employer]   [FreeTextEntry1] : Bilateral leg pain and swelling

## 2022-01-12 NOTE — HISTORY OF PRESENT ILLNESS
[FreeTextEntry1] : Patient is a 56 y.o female that works on her feet the entire day presenting with bilateral leg pain l>r\par Patient states pain is worse later in the day. Not relieved by compression socks.\par She denied foot wounds and discoloration\par No claudication\par No hx of DVTs /PE\par no family hx of DVTs and PE\par \par

## 2022-01-12 NOTE — PHYSICAL EXAM
[2+] : left 2+ [Ankle Swelling (On Exam)] : present [Ankle Swelling Bilaterally] : bilaterally  [Varicose Veins Of Lower Extremities] : bilaterally [Ankle Swelling On The Right] : mild [JVD] : no jugular venous distention  [] : not present [No Rash or Lesion] : No rash or lesion [Alert] : alert [Oriented to Person] : oriented to person [Oriented to Place] : oriented to place [Oriented to Time] : oriented to time [Calm] : calm [de-identified] : NAD [FreeTextEntry1] : No wounds or discoloration, Minor varicosities

## 2022-01-12 NOTE — ASSESSMENT
[Arterial/Venous Disease] : arterial/venous disease [FreeTextEntry1] : bl LE mild swelling and pain\par no arterial or venous insufficiency found to explain pt's symptoms\par Duplex neg for DVTs or reflux\par LE elevation and compression \par f/up prn

## 2022-01-24 ENCOUNTER — APPOINTMENT (OUTPATIENT)
Dept: ORTHOPEDIC SURGERY | Facility: CLINIC | Age: 57
End: 2022-01-24
Payer: MEDICAID

## 2022-01-24 PROCEDURE — 99213 OFFICE O/P EST LOW 20 MIN: CPT

## 2022-01-31 NOTE — HISTORY OF PRESENT ILLNESS
[de-identified] : 55 year old RHD female presents today for follow up of right shoulder adhesive capsulitis. She has been doing exercises on her own. Pain in the shoulder has improved but has developed to her  upper arm and forearm. She is struggling with ADLs.  She received a cortisone injection 6/5/21 No injury reported.  Reports loss of motion. Diclofenac gel is helpful. Denies numbness or tingling.

## 2022-01-31 NOTE — PHYSICAL EXAM
[de-identified] : Oriented to time, place, person\par Mood: Normal\par Affect: Normal\par Appearance: Healthy, well appearing, no acute distress.\par Gait: Normal\par Assistive Devices: None\par \par Right shoulder exam:\par \par Inspection: No malalignment, No defects, No atrophy\par Skin: No masses, No lesions\par Neck: Negative Spurling, full ROM, no pain with ROM\par AROM: FF to 125, abduction to 70, ER to 10, IR to mid lumbar\par Painful arc ROM: Pain with terminal motion passive=active\par Tenderness: No bicipital tenderness, no tenderness to greater tuberosity/RTC insertion, no anterior shoulder/lesser tuberosity tenderness\par Strength: 5/5 ER, 5/5 IR in adduction, 5/5 supraspinatus testing, negative Paulding's test\par AC joint: No TTP/pain with cross arm testing\par Biceps: Speed Negative, Yergason Negative \par Impingement test: Negative Greene, Negative Neer\par Vasc: 2+ radial pulse \par Stability: Stable \par Neuro: AIN, PIN, Ulnar nerve intact to motor\par Sensation: Intact to light touch throughout  [de-identified] : Images were reviewed from NH dated 3.27.2021.\par \par 3 views of right shoulder were obtained, that show no acute fracture or dislocation. There is no glenohumeral and no AC joint degenerative change seen. Type II acromion. There is no significant malalignment. No significant other obvious osseous abnormality, otherwise unremarkable. \par \par MRI right shoulder dated 4.27.2021 shows mild to moderate insertional supraspinatus and infraspinatus tendinosis with no high-grade RTC injury.  No significant internal derangement.

## 2022-01-31 NOTE — DISCUSSION/SUMMARY
[de-identified] : 56 y/o female with right shoulder adhesive capsulitis. \par \par The patient presents with follow-up right shoulder adhesive capsulitis. She reports significant improvement of pain but slow improvement of the ROM to the shoulder. We again discussed long-term implications of adhesive capsulitis and continued conservative management and continue physical therapy. \par \par Recommendation: Continue PT, Rx given.  NSAIDs as needed.  Topicals as needed.  Home exercise program/range of motion as tolerated.\par \par Follow up 3 months

## 2022-01-31 NOTE — ADDENDUM
[FreeTextEntry1] : This note was written by Ani Montano on 01/24/2022 acting solely as a scribe for Dr. Rik Peace.\par \par All medical record entries made by the Scribe were at my, Dr. Rik Peace, direction and personally dictated by me on 01/24/2022. I have personally reviewed the chart and agree that the record accurately reflects my personal performance of the history, physical exam, assessment and plan.

## 2022-02-01 ENCOUNTER — APPOINTMENT (OUTPATIENT)
Dept: INTERNAL MEDICINE | Facility: CLINIC | Age: 57
End: 2022-02-01
Payer: MEDICAID

## 2022-02-01 VITALS
WEIGHT: 166.32 LBS | HEIGHT: 60.91 IN | SYSTOLIC BLOOD PRESSURE: 107 MMHG | OXYGEN SATURATION: 95 % | TEMPERATURE: 98.4 F | HEART RATE: 68 BPM | DIASTOLIC BLOOD PRESSURE: 66 MMHG | BODY MASS INDEX: 31.4 KG/M2

## 2022-02-01 PROCEDURE — 99213 OFFICE O/P EST LOW 20 MIN: CPT

## 2022-02-01 RX ORDER — PREDNISONE 20 MG/1
20 TABLET ORAL
Qty: 12 | Refills: 1 | Status: DISCONTINUED | COMMUNITY
Start: 2021-02-18 | End: 2022-02-01

## 2022-02-02 NOTE — PHYSICAL EXAM
[Normal] : the outer ears and nose were normal in appearance and the oropharynx was normal [No Focal Deficits] : no focal deficits [de-identified] : La sign positive on the right hand

## 2022-02-02 NOTE — ASSESSMENT
[FreeTextEntry1] : The eye may be secondary to subconjunctival hemorrhage, advised artificial tears to be taken\par Suspect carpal tunnel syndrome of the right hand no thenar wasting or weakness, advised wrist splints if no improvement consider neuro or hand specialist

## 2022-02-02 NOTE — HISTORY OF PRESENT ILLNESS
[FreeTextEntry1] : Patient presents for follow-up [de-identified] : Patient sometimes cries gets redness of the eye that later resolves\par Will sometimes gets numbness of the hand denies any weakness elbow pain denies any headaches\par Neck pain is improving denies any clumsiness gait instability\par Denies any drainage of the left eye denies any difficulty with seeing

## 2022-02-04 NOTE — ED ADULT NURSE NOTE - NS ED NURSE LEVEL OF CONSCIOUSNESS AFFECT
PAST MEDICAL HISTORY:  Bilateral knee pain     Lower back pain     Obesity BMI >40    Obstructive sleep apnea on CPAP      Calm

## 2022-02-08 ENCOUNTER — APPOINTMENT (OUTPATIENT)
Dept: INTERNAL MEDICINE | Facility: CLINIC | Age: 57
End: 2022-02-08
Payer: MEDICAID

## 2022-02-08 PROCEDURE — 99441: CPT

## 2022-02-09 NOTE — ASSESSMENT
[FreeTextEntry1] : Given persistent subconjunctival hemorrhage did advise to do coagulation panel see ophthalmology.  Discussed importance of stress management

## 2022-02-09 NOTE — HISTORY OF PRESENT ILLNESS
[Home] : at home, [unfilled] , at the time of the visit. [Medical Office: ([facility]___] : at the medical office located in  [Family Member] : family member [FreeTextEntry8] : Patient presents to the office has had multiple episodes of subconjunctival hemorrhage feels a blood vessel burst\par Happens with stress\par Denies any constipation\par Denies any headache or bloody nose\par Denies any change in vision or eye pain

## 2022-02-24 ENCOUNTER — APPOINTMENT (OUTPATIENT)
Dept: INTERNAL MEDICINE | Facility: CLINIC | Age: 57
End: 2022-02-24

## 2022-02-25 ENCOUNTER — APPOINTMENT (OUTPATIENT)
Dept: INTERNAL MEDICINE | Facility: CLINIC | Age: 57
End: 2022-02-25
Payer: MEDICAID

## 2022-02-25 DIAGNOSIS — H11.32 CONJUNCTIVAL HEMORRHAGE, LEFT EYE: ICD-10-CM

## 2022-02-25 PROCEDURE — 36415 COLL VENOUS BLD VENIPUNCTURE: CPT

## 2022-02-27 LAB
APTT BLD: 31 SEC
INR PPP: 1.01 RATIO
PT BLD: 11.9 SEC

## 2022-04-14 ENCOUNTER — NON-APPOINTMENT (OUTPATIENT)
Age: 57
End: 2022-04-14
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
## 2022-05-23 ENCOUNTER — APPOINTMENT (OUTPATIENT)
Dept: ORTHOPEDIC SURGERY | Facility: CLINIC | Age: 57
End: 2022-05-23
Payer: MEDICAID

## 2022-05-23 VITALS — WEIGHT: 150 LBS | BODY MASS INDEX: 28.32 KG/M2 | HEIGHT: 61 IN

## 2022-05-23 DIAGNOSIS — M75.01 ADHESIVE CAPSULITIS OF RIGHT SHOULDER: ICD-10-CM

## 2022-05-23 PROCEDURE — 99213 OFFICE O/P EST LOW 20 MIN: CPT

## 2022-05-23 NOTE — REASON FOR VISIT
[Follow-Up Visit] : a follow-up visit for [Shoulder Pain] : shoulder pain [[employer] ] : provided by [employer]   [Interpreters_IDNumber] : [id number]

## 2022-05-23 NOTE — PHYSICAL EXAM
[de-identified] : Oriented to time, place, person\par Mood: Normal\par Affect: Normal\par Appearance: Healthy, well appearing, no acute distress.\par Gait: Normal\par Assistive Devices: None\par \par Right shoulder exam:\par \par Inspection: No malalignment, No defects, No atrophy\par Skin: No masses, No lesions\par Neck: Negative Spurling, full ROM, no pain with ROM\par AROM: FF to 170, abduction to 90, ER to 40, IR to upper lumbar\par Painful arc ROM:  minimal\par Tenderness: No bicipital tenderness, no tenderness to greater tuberosity/RTC insertion, no anterior shoulder/lesser tuberosity tenderness\par Strength: 5/5 ER, 5/5 IR in adduction, 5/5 supraspinatus testing, negative Gladwin's test\par AC joint: No TTP/pain with cross arm testing\par Biceps: Speed Negative, Yergason Negative \par Impingement test: Negative Greene, Negative Neer\par Vasc: 2+ radial pulse \par Stability: Stable \par Neuro: AIN, PIN, Ulnar nerve intact to motor\par Sensation: Intact to light touch throughout  [de-identified] : Images were reviewed from NH dated 3.27.2021.\par \par 3 views of right shoulder were obtained, that show no acute fracture or dislocation. There is no glenohumeral and no AC joint degenerative change seen. Type II acromion. There is no significant malalignment. No significant other obvious osseous abnormality, otherwise unremarkable. \par \par MRI right shoulder dated 4.27.2021 shows mild to moderate insertional supraspinatus and infraspinatus tendinosis with no high-grade RTC injury.  No significant internal derangement.

## 2022-05-23 NOTE — ADDENDUM
[FreeTextEntry1] : This note was written by Ani Montano on 05/23/2022 acting solely as a scribe for Dr. Rik Peace.\par \par All medical record entries made by the Scribe were at my, Dr. Rik Peace, direction and personally dictated by me on 05/23/2022. I have personally reviewed the chart and agree that the record accurately reflects my personal performance of the history, physical exam, assessment and plan.

## 2022-05-23 NOTE — DISCUSSION/SUMMARY
[de-identified] : 55 y/o female with right shoulder adhesive capsulitis. \par \par The patient presents with follow-up right shoulder adhesive capsulitis. She reports significant improvement of pain and ROM with physical therapy, and has good clinical function on today's examination.  She has some mild residual complaints of ipsilateral forearm/wrist discomfort that is consistent with underlying carpal tunnel syndrome.  We discussed consideration of splinting and possible local injection therapy.\par \par Recommendation: Activity to tolerance.  NSAIDs/ice as needed.  Home exercise program/range of motion as tolerated. \par \par Follow up Ortho Hand for CTS w/u

## 2022-06-01 ENCOUNTER — APPOINTMENT (OUTPATIENT)
Dept: INTERNAL MEDICINE | Facility: CLINIC | Age: 57
End: 2022-06-01
Payer: MEDICAID

## 2022-06-01 VITALS
OXYGEN SATURATION: 98 % | WEIGHT: 155 LBS | HEIGHT: 61 IN | BODY MASS INDEX: 29.27 KG/M2 | SYSTOLIC BLOOD PRESSURE: 113 MMHG | TEMPERATURE: 96.7 F | DIASTOLIC BLOOD PRESSURE: 72 MMHG | HEART RATE: 62 BPM

## 2022-06-01 DIAGNOSIS — R51.9 HEADACHE, UNSPECIFIED: ICD-10-CM

## 2022-06-01 PROCEDURE — 99214 OFFICE O/P EST MOD 30 MIN: CPT | Mod: 25

## 2022-06-01 NOTE — ASSESSMENT
[FreeTextEntry1] : -Medication refill given for HLD.\par -Pt have appointment with hand specialist for possible CTS.\par \par Headache likely Tension headache.\par -given progressively worsening headache and new onset headache after age 49 -MRI ordered\par -Continue to use Advil.\par -Physical Therapy referral for Neck pain and headache-possible cervicogenic origin

## 2022-06-01 NOTE — PHYSICAL EXAM
[Normal] : normal rate, regular rhythm, normal S1 and S2 and no murmur heard [de-identified] : Cervical paraspinal spasm

## 2022-06-01 NOTE — HISTORY OF PRESENT ILLNESS
[FreeTextEntry1] : Patient presents for follow-up. [de-identified] : Patient has been to orthopedist and was advised to see hand specialist for possible CTS.\par Pt c/o progressively worsening Bilateral headache which has been going on for 1 month. Describes as dull pain.\par Denies dizziness, lightheadedness,  or gait instability\par Denies vomiting, or vision issues\par Also notes of Neck pain.\par Has used Diclofenac.\par \par s/p covid-19 infection 2 months ago.

## 2022-06-01 NOTE — ADDENDUM
[FreeTextEntry1] : I, Nehemias Ferreira, acted as a scribe on behalf of Dr. Roger Leon MD, on 06/01/2022. \par \par All medical entries made by the scribe were at my, Dr. Roger Leon MD, direction and personally dictated by me on 06/01/2022. I have reviewed the chart and agree that the record accurately reflects my personal performance of the history, physical exam, assessment and plan. I have also personally directed, reviewed, and agreed with the chart.

## 2022-06-03 ENCOUNTER — APPOINTMENT (OUTPATIENT)
Dept: ORTHOPEDIC SURGERY | Facility: CLINIC | Age: 57
End: 2022-06-03
Payer: MEDICAID

## 2022-06-03 VITALS
WEIGHT: 155 LBS | OXYGEN SATURATION: 96 % | HEIGHT: 61 IN | SYSTOLIC BLOOD PRESSURE: 112 MMHG | HEART RATE: 70 BPM | BODY MASS INDEX: 29.27 KG/M2 | DIASTOLIC BLOOD PRESSURE: 70 MMHG

## 2022-06-03 DIAGNOSIS — R20.0 ANESTHESIA OF SKIN: ICD-10-CM

## 2022-06-03 DIAGNOSIS — M77.8 OTHER ENTHESOPATHIES, NOT ELSEWHERE CLASSIFIED: ICD-10-CM

## 2022-06-03 DIAGNOSIS — R20.2 ANESTHESIA OF SKIN: ICD-10-CM

## 2022-06-03 PROCEDURE — 99204 OFFICE O/P NEW MOD 45 MIN: CPT | Mod: 57

## 2022-06-03 PROCEDURE — 73080 X-RAY EXAM OF ELBOW: CPT | Mod: LT,RT

## 2022-06-03 PROCEDURE — 73110 X-RAY EXAM OF WRIST: CPT | Mod: LT,RT

## 2022-06-03 RX ORDER — MELOXICAM 15 MG/1
15 TABLET ORAL
Qty: 30 | Refills: 0 | Status: ACTIVE | COMMUNITY
Start: 2021-10-21

## 2022-06-22 ENCOUNTER — OUTPATIENT (OUTPATIENT)
Dept: OUTPATIENT SERVICES | Facility: HOSPITAL | Age: 57
LOS: 1 days | End: 2022-06-22
Payer: MEDICAID

## 2022-06-22 ENCOUNTER — APPOINTMENT (OUTPATIENT)
Dept: MRI IMAGING | Facility: CLINIC | Age: 57
End: 2022-06-22
Payer: MEDICAID

## 2022-06-22 DIAGNOSIS — Z00.8 ENCOUNTER FOR OTHER GENERAL EXAMINATION: ICD-10-CM

## 2022-06-22 DIAGNOSIS — R51.9 HEADACHE, UNSPECIFIED: ICD-10-CM

## 2022-06-22 PROCEDURE — A9585: CPT

## 2022-06-22 PROCEDURE — 70553 MRI BRAIN STEM W/O & W/DYE: CPT | Mod: 26

## 2022-06-22 PROCEDURE — 70553 MRI BRAIN STEM W/O & W/DYE: CPT

## 2022-07-19 ENCOUNTER — APPOINTMENT (OUTPATIENT)
Dept: ORTHOPEDIC SURGERY | Facility: HOSPITAL | Age: 57
End: 2022-07-19

## 2022-08-01 ENCOUNTER — APPOINTMENT (OUTPATIENT)
Dept: ORTHOPEDIC SURGERY | Facility: CLINIC | Age: 57
End: 2022-08-01

## 2022-08-01 VITALS — BODY MASS INDEX: 24.91 KG/M2 | WEIGHT: 155 LBS | HEIGHT: 66 IN

## 2022-08-01 PROCEDURE — 99213 OFFICE O/P EST LOW 20 MIN: CPT

## 2022-08-01 NOTE — DISCUSSION/SUMMARY
[FreeTextEntry1] : The underlying pathophysiology was reviewed with the patient. Discussed at length the nature of the patient’s condition. The bilateral hand symptoms appear secondary to CTS.\par \par At this time, I recommended an EMG for further evaluation.\par An EMG/NCV test of the upper extremities was ordered to evaluate for bilateral carpal tunnel. Patient will follow-up to review the results and discuss further treatment recommendations. \par At her request, she was provided with a prescription for hand therapy.\par \par RX: Meloxicam 15mg\par \par All questions answered, understanding verbalized. Patient in agreement with plan of care.

## 2022-08-01 NOTE — HISTORY OF PRESENT ILLNESS
[Right] : right hand dominant [FreeTextEntry1] : Pt is a 55 y/o female with bilateral hand numbness and tingling x 1 year.  It is worse at night.  It wakes her from sleep.  She wears wrist support splints which help but she still wakes occasionally.  It is intermittent throughout the day.  She has not had an EMG.  She has not had cortisone injections.  She takes Meloxicam 15mg for pain but has since ran out.

## 2022-08-01 NOTE — ADDENDUM
[FreeTextEntry1] : I, Roxann Naranjo wrote this note acting as a scribe for Dr. Ramos Henderson on Aug 01, 2022.

## 2022-08-01 NOTE — PHYSICAL EXAM
[de-identified] : Patient is WDWN, alert, and in no acute distress. Breathing is unlabored. She is grossly oriented to person, place, and time.\par \par Right Wrist: \par No tenderness, edema, or deformities. No thenar atrophy. Full ROM with decreased sensation along median nerve distribution. \par Tests/Signs: Tinel's sign is negative over carpal tunnel, Phalen's test is positive. \par \par Left Wrist:\par No tenderness, edema, or deformities. No thenar atrophy. Full ROM with decreased sensation along median nerve distribution. \par Tests/Signs: Tinel's sign is negative over carpal tunnel, Phalen's test is positive. [de-identified] : no imaging today

## 2022-08-01 NOTE — END OF VISIT
[FreeTextEntry3] : All medical record entries made by the Scribe were at my,  Dr. Ramos Henderson MD., direction and personally dictated by me on 08/01/2022. I have personally reviewed the chart and agree that the record accurately reflects my personal performance of the history, physical exam, assessment and plan.

## 2022-08-03 ENCOUNTER — APPOINTMENT (OUTPATIENT)
Dept: ORTHOPEDIC SURGERY | Facility: CLINIC | Age: 57
End: 2022-08-03

## 2022-09-04 ENCOUNTER — EMERGENCY (EMERGENCY)
Facility: HOSPITAL | Age: 57
LOS: 1 days | Discharge: HOME | End: 2022-09-04
Attending: EMERGENCY MEDICINE
Payer: MEDICAID

## 2022-09-04 VITALS
TEMPERATURE: 98 F | RESPIRATION RATE: 20 BRPM | HEART RATE: 70 BPM | WEIGHT: 160.06 LBS | DIASTOLIC BLOOD PRESSURE: 82 MMHG | OXYGEN SATURATION: 95 % | SYSTOLIC BLOOD PRESSURE: 139 MMHG | HEIGHT: 64 IN

## 2022-09-04 PROCEDURE — 99283 EMERGENCY DEPT VISIT LOW MDM: CPT | Mod: 25

## 2022-09-04 PROCEDURE — 12001 RPR S/N/AX/GEN/TRNK 2.5CM/<: CPT

## 2022-09-04 RX ORDER — TETANUS TOXOID, REDUCED DIPHTHERIA TOXOID AND ACELLULAR PERTUSSIS VACCINE, ADSORBED 5; 2.5; 8; 8; 2.5 [IU]/.5ML; [IU]/.5ML; UG/.5ML; UG/.5ML; UG/.5ML
0.5 SUSPENSION INTRAMUSCULAR ONCE
Refills: 0 | Status: COMPLETED | OUTPATIENT
Start: 2022-09-04 | End: 2022-09-04

## 2022-09-04 RX ORDER — IBUPROFEN 200 MG
600 TABLET ORAL ONCE
Refills: 0 | Status: COMPLETED | OUTPATIENT
Start: 2022-09-04 | End: 2022-09-04

## 2022-09-04 RX ADMIN — Medication 600 MILLIGRAM(S): at 22:43

## 2022-09-04 RX ADMIN — TETANUS TOXOID, REDUCED DIPHTHERIA TOXOID AND ACELLULAR PERTUSSIS VACCINE, ADSORBED 0.5 MILLILITER(S): 5; 2.5; 8; 8; 2.5 SUSPENSION INTRAMUSCULAR at 22:42

## 2022-09-04 NOTE — ED PROVIDER NOTE - NSFOLLOWUPINSTRUCTIONS_ED_ALL_ED_FT
Please see the information of sutured wound care.    Elevate the affected finger.    Keep the wound clean and dry washing with soap and water 24hours later.    Bacitracin ointment to wound twice a day.    Return in 7-10days for suture removal or follow up with your primary Dr.    Return for any concerns, fever, numbness, weakness, redness/discharge around wound, or worsening pain.

## 2022-09-04 NOTE — ED PROCEDURE NOTE - ATTENDING APP SHARED VISIT CONTRIBUTION OF CARE
I, EM Attending, Bassam Atkins was present for and supervised the critical portions of the procedure performed by the Resident Physician or TA.

## 2022-09-04 NOTE — ED PROVIDER NOTE - PATIENT PORTAL LINK FT
You can access the FollowMyHealth Patient Portal offered by [facility] by registering at the following website: [email or web address]. By joining [employer]’s FollowMyHealth portal, you will also be able to view your health information using other applications (apps) compatible with our system.
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
## 2022-09-04 NOTE — ED PROCEDURE NOTE - NS ED ATTENDING STATEMENT MOD
This was a shared visit with the TA. I reviewed and verified the documentation and independently performed the documented:

## 2022-09-04 NOTE — ED ADULT NURSE NOTE - NS ED NURSE DISCH DISPOSITION
Hospitalist/IM Consult History and Physical     Patient: Jacinda Ewing Admission Date: 7/30/2018   YOB: 1961 Attending: Madi Hopper MD   MRN: [id number] Code Status: Full Resuscitation   Primary care physician:  Camilla Sharma MD      Date of Service : 7/30/2018    Requesting physician: Madi Hopper MD    Reason for consult: Diabetic Management    History Limitations : None    HPI: Patient is a 56 year old year old female with PMH as below, presents to the hospital for left total knee replacement. Patient seen and examined in the post op period. Notes that he pain is well controlled and with no complaints at this time. Patient notes that her lantus has just recently been increased.     Past Medical History  Past Medical History:   Diagnosis Date   • Acid reflux    • Anxiety    • Arthritis     thoracic spine and shoulder    • Asthma    • Bilateral posterior subcapsular polar cataract    • Bipolar disorder (CMS/[facility])    • COPD (chronic obstructive pulmonary disease) (CMS/[facility])    • DM (diabetes mellitus) (CMS/[facility])     type2   • Fibromyalgia    • Frequent UTI    • Hypertension    • Nausea with vomiting 7/29/15   • Numbness of legs     and hips    • Obstructive sleep apnea     C-pap   • OCD (obsessive compulsive disorder)    • Plantar fasciitis    • Pneumonia 9/20/15   • PONV (postoperative nausea and vomiting)    • Prolonged QT interval 11/29/2017   • Seasonal allergies    • Spastic colon    • Thyroid nodule     left side FNA negative 1/2016   • Vomiting 2015   • Weakness of wrist       Past Surgical History  Past Surgical History:   Procedure Laterality Date   • Colonoscopy  10/23/14    recall 3 years due to adenomatous polyp   • Colposcopy  2012    lgsil   • Esophagogastroduodenoscopy transoral flex diag  7/29/15    Dr Last   • Laparoscopy,tubal cautery      Tubal Ligation   • Removal gallbladder      Cholecystectomy (gallbladder)   • Removal of ovary/tube(s)      Ovary and Tube, removal right   •  TWO  TIMES DAILY BEFORE MEALS  Qty: 180 tablet, Refills: 0      montelukast (SINGULAIR) 10 MG tablet TAKE 1 TABLET BY MOUTH  NIGHTLY  Qty: 90 tablet, Refills: 0      buPROPion (WELLBUTRIN XL) 150 MG 24 hr tablet Take 1 tablet by mouth daily.  Qty: 90 tablet, Refills: 0      QUEtiapine (SEROQUEL) 200 MG tablet Take 1 tablet by mouth 2 times daily.  Qty: 180 tablet, Refills: 0    Comments: Please cancel any refill on file for this medication.      DULoxetine (CYMBALTA) 60 MG capsule Take 1 capsule by mouth daily.  Qty: 90 capsule, Refills: 0    Comments: Please cancel any Rxs on file for this medication dated PRIOR TO 12/30/2016      sitaGLIPtin (JANUVIA) 100 MG tablet Take 1 tablet by mouth daily.  Qty: 90 tablet, Refills: 1      erythromycin ethylsuccinate (EES) 200 MG/5ML suspension TAKE 5 MLS BY MOUTH TWO TIMES DAILY BEFORE MEALS  Qty: 100 mL, Refills: 11    Comments: Please consider 90 day supplies to promote better adherence  Associated Diagnoses: Diabetic gastroparesis (CMS/HCC)      hydrOXYzine (ATARAX) 25 MG tablet TAKE 1 TABLET IN THE  MORNING AND 2 TABLETS AT  BEDTIME AS NEEDED.  Qty: 270 tablet, Refills: 0      lovastatin (MEVACOR) 40 MG tablet TAKE 1 TABLET BY MOUTH  DAILY  Qty: 90 tablet, Refills: 1      gabapentin (NEURONTIN) 600 MG tablet TAKE 1 TABLET BY MOUTH 3  TIMES DAILY  Qty: 270 tablet, Refills: 3      acetaminophen (TYLENOL) 500 MG tablet Take 1,500 mg by mouth every 12 hours.      budesonide-formoterol (SYMBICORT) 160-4.5 MCG/ACT inhaler Inhale 2 puffs into the lungs 2 times daily.  Qty: 1 Inhaler, Refills: 0      Multiple Vitamins-Minerals (MULTIVITAL PO)       Lactobacillus (PROBIOTIC ACIDOPHILUS) Cap       fluticasone (FLONASE) 50 MCG/ACT nasal spray Spray 1 spray in each nostril daily.  Qty: 3 Bottle, Refills: 0      PROAIR  (90 BASE) MCG/ACT inhaler Use 2 puffs every 4 hours  as needed for  shortness of  breath or wheezing  Qty: 3 Inhaler, Refills: 4      aspirin 81 MG tablet Take 1 tablet by mouth daily.      guaiFENesin (MUCINEX) 600 MG 12 hr tablet Take 2 tablets by mouth daily.      melatonin 3 MG Tab Take 2 tablets by mouth nightly. Indications: Trouble Sleeping  Qty: 60 tablet, Refills: 1      CALCIUM PO       Insulin Pen Needle 32G X 4 MM Misc Inject insulin into the skin daily. Dx E11.9  Qty: 100 each, Refills: 1    Associated Diagnoses: Type 2 diabetes mellitus without complication, without long-term current use of insulin (CMS/[facility])      !! ACCU-CHEK FASTCLIX LANCETS Misc Use to test blood sugar once daily  Qty: 100 each, Refills: 4    Associated Diagnoses: Type 2 diabetes mellitus without complication, without long-term current use of insulin (CMS/[facility])      !! blood glucose (ACCU-CHEK NATASHA PLUS) test strip Test blood sugar one time daily as directed. Diagnosis: E11.9. Meter: Accu chek Natasha Connect  Qty: 100 strip, Refills: 4    Associated Diagnoses: Type 2 diabetes mellitus without complication, without long-term current use of insulin (CMS/[facility])      !! blood glucose test strips (ACCU-CHEK NATASHA) Check blood sugar daily  Qty: 100 strip, Refills: 3    Comments: DX:  E11.9  Associated Diagnoses: DM II (diabetes mellitus, type II), controlled (CMS/[facility])      diphenhydrAMINE (BENADRYL) 25 MG tablet Take 1 tablet by mouth every 6 hours as needed for Itching.  Qty: 30 tablet, Refills: 0      albuterol (ACCUNEB) 1.25 MG/3ML nebulizer solution Take 3 mLs by nebulization every 4 hours as needed for Wheezing or Shortness of Breath.  Qty: 1125 mL, Refills: 4    Associated Diagnoses: Chronic obstructive pulmonary disease, unspecified COPD type (CMS/[facility])      !! MONOLET OPD LANCETS Misc Check blood sugar daily  Qty: 100 each, Refills: 1    Associated Diagnoses: DM II (diabetes mellitus, type II), controlled (CMS/[facility])       !! - Potential duplicate medications found. Please discuss with provider.         Allergies  ALLERGIES:   Allergen Reactions   • Angiotensin Receptor Blockers SWELLING     Angioedema with fosinopril   • Fosinopril ANAPHYLAXIS     angioedema   • Metformin Other (See Comments)     Lactic acidosis   • Penicillins SWELLING   • Cat Dander Other (See Comments)     Side effect   • Mold Other (See Comments)   • Pollen Other (See Comments)   • Trees Other (See Comments)   • Victoza [Liraglutide] VOMITING      Review of Systems:  Positive for nothing. Rest 12 point systems reviewed and are negative except points mentioned here and in HPI.     Physical Exam:  Vital Signs: Blood pressure 158/79, pulse 107, temperature 98.7 °F (37.1 °C), temperature source Oral, resp. rate 14, height 5' 4\" (1.626 m), weight 114.7 kg, last menstrual period 04/11/2011, SpO2 95 %.   · Gen: Distress - none well built and nourished.   · HEENT: AT/NC. PERRL. EOMI; Oral mucousa moist, Conjunctiva pink, No icterus, No rhinorrhea.  · Neck: Supple, No JVD.  · CV: S1-S2 present. RRR. No murmurs, No rub, PMI not displaced.  · Resp: Good air entry BL. No wheezing, No crepitations.  · GI: Soft, Tenderness - none, ND, No guarding, No rigidity. No palpable organomegaly.  · MS: No Cynosis, No LE edema, ROM grossly normal.  · Skin: Warm, No rashes.  · Neuro: A/O x 3. CN II-XII grossly intact. Sensation intact. Muscle strength and tone equal on both sides.  · Psych: Cooperative. Mood normal. No agitation or psychosis.     DATA  Labs:  I have personally reviewed the following studies:  No results found    Invalid input(s): CAPTH, MAGNESIUM, ALKPHOS, ALT, LIPASE, AMYLASE No results found    Invalid input(s): ESR, URICACID, SERUMOSMLLTY     No results found for this or any previous visit.  Imaging: I have reviewed following.  Xr Chest Pa And Lateral    Result Date: 7/12/2018  XR CHEST PA AND LATERAL HISTORY: Encounter for other preprocedural examination COMPARISON:  February 2, 2016 radiograph FINDINGS: Lungs/Pleura:  Radiographically clear.  Mediastinum: No radiographic evidence of lymphadenopathy. No evidence of cardiac silhouette enlargement. Bones: No acute fracture. Other: None.     IMPRESSION: No acute radiographic finding.       ASSESSMENT/ PLAN:     Osteoarthritis s/p Left Total Knee Replacement-management per primary team, pt/ot pain control    [id number] evening lantus to 35 tonight, hold glipizide and sliding scale with short acting insulin for now    COPD--continue pta meds, notably not on inhaler    HTN-stable at this time, continue usual meds    Bipolar/OCD/BETSEY-continue usual meds, stable     :     The plan was discussed with - Pt, RN and family     Evaluation was completed on 7/30/2018.    Thank you very much for involving hospitalist team in this patient's care. We will continue to follow along.      Carol Marroquin DO MPH  Hospitalist     Discharged

## 2022-09-04 NOTE — ED PROVIDER NOTE - PHYSICAL EXAMINATION
NAD. VSS. Afebrile, Lungs. +Left hand; superficial 1cm laceration on 2nd finger tip, N/V- intact. no active bleeding.

## 2022-09-04 NOTE — ED ADULT NURSE NOTE - OBJECTIVE STATEMENT
55 yo F pt PMHx of HLD p/w L hand 2nd digit Laceration 1 cm cleaned and dressed hemostasis achieved with pressure. pt was cutting open a bag when she cut herself with a knife. unsure of last TDAP.  pt is A&Ox4, MAEW, PMS intact of affected digit, 1 cm Lac noted to L hand 2nd digit.   Pt denies headache, dizziness, chest pain, palpitations, cough, SOB, abdominal pain, n/v/d, urinary symptoms, fevers, chills, weakness at this time.

## 2022-09-04 NOTE — ED PROVIDER NOTE - OBJECTIVE STATEMENT
56y F w/ pmhx of carpal tunnel presents to the ED due to L 2nd digit lac s/p opening bag with knife and cut her finger 4hrs ago. Denies numbness and tingling to finger. Denies OTC pain medication use. R hand dom. Unsure of last TDAP. Denies smoking/drinking. 56y F w/ pmhx of carpal tunnel presents to the ED due to L non dominant 2nd digit lac s/p opening bag with knife and cut her finger 4hrs ago. Denies other injuries. Denies numbness and tingling to finger. Denies fever, chills, cough or recent sickness. Denies OTC pain medication use. Unsure of last TDAP. Denies smoking/drinking.

## 2022-09-09 ENCOUNTER — NON-APPOINTMENT (OUTPATIENT)
Age: 57
End: 2022-09-09

## 2022-09-09 ENCOUNTER — APPOINTMENT (OUTPATIENT)
Dept: INTERNAL MEDICINE | Facility: CLINIC | Age: 57
End: 2022-09-09

## 2022-09-09 VITALS
WEIGHT: 162.08 LBS | DIASTOLIC BLOOD PRESSURE: 68 MMHG | SYSTOLIC BLOOD PRESSURE: 109 MMHG | HEART RATE: 74 BPM | OXYGEN SATURATION: 97 %

## 2022-09-09 DIAGNOSIS — Z48.02 ENCOUNTER FOR REMOVAL OF SUTURES: ICD-10-CM

## 2022-09-09 PROCEDURE — 99213 OFFICE O/P EST LOW 20 MIN: CPT

## 2022-09-09 NOTE — HISTORY OF PRESENT ILLNESS
[FreeTextEntry8] : Patient presents to office for suture removal on L index finger.\par Denies any pain.

## 2022-09-09 NOTE — ADDENDUM
[FreeTextEntry1] : I, Nehemias Ferreira, acted as a scribe on behalf of Dr. Roger Leon MD, on 09/09/2022. \par \par All medical entries made by the scribe were at my, Dr. Roger Leon MD, direction and personally dictated by me on 09/09/2022. I have reviewed the chart and agree that the record accurately reflects my personal performance of the history, physical exam, assessment and plan. I have also personally directed, reviewed, and agreed with the chart.

## 2022-10-17 DIAGNOSIS — G56.21 LESION OF ULNAR NERVE, RIGHT UPPER LIMB: ICD-10-CM

## 2022-12-08 ENCOUNTER — APPOINTMENT (OUTPATIENT)
Dept: INTERNAL MEDICINE | Facility: CLINIC | Age: 57
End: 2022-12-08

## 2022-12-08 VITALS
SYSTOLIC BLOOD PRESSURE: 138 MMHG | WEIGHT: 162.68 LBS | BODY MASS INDEX: 30.71 KG/M2 | DIASTOLIC BLOOD PRESSURE: 74 MMHG | HEIGHT: 61.02 IN | HEART RATE: 66 BPM | TEMPERATURE: 97.4 F | OXYGEN SATURATION: 99 %

## 2022-12-08 DIAGNOSIS — I83.819 VARICOSE VEINS OF UNSPECIFIED LOWER EXTREMITY WITH PAIN: ICD-10-CM

## 2022-12-08 PROCEDURE — 99213 OFFICE O/P EST LOW 20 MIN: CPT

## 2022-12-08 RX ORDER — MELOXICAM 15 MG/1
15 TABLET ORAL
Qty: 30 | Refills: 0 | Status: ACTIVE | COMMUNITY
Start: 2022-08-01 | End: 1900-01-01

## 2022-12-09 NOTE — ASSESSMENT
[FreeTextEntry1] : suspect symptomatic varicose veins, possibly superficial thrombophlebitis NSAID given to order ultrasound and name of vascular provider for symptomatic varicose veins advise keeping legs elevated using compression stockings

## 2022-12-09 NOTE — PHYSICAL EXAM
[Normal] : normal sclera/conjunctiva, pupils are equal, round and reactive to light and extraocular movements are intact [de-identified] : Tenderness over the lateral malleolus, slight warmth over the varicosities

## 2022-12-09 NOTE — HISTORY OF PRESENT ILLNESS
[FreeTextEntry8] : Patient presents to the office has pain over the left lateral ankle, around the site of the varicose veins has been standing a lot at work\par Denies any erythema denies any warmth.  Denies any shortness of breath\par Denies any pleuritic chest pain

## 2023-02-07 ENCOUNTER — APPOINTMENT (OUTPATIENT)
Dept: NEUROLOGY | Facility: CLINIC | Age: 58
End: 2023-02-07
Payer: MEDICAID

## 2023-02-07 PROCEDURE — 95885 MUSC TST DONE W/NERV TST LIM: CPT

## 2023-02-07 PROCEDURE — 95910 NRV CNDJ TEST 7-8 STUDIES: CPT

## 2023-02-13 PROBLEM — E78.5 HYPERLIPIDEMIA, UNSPECIFIED: Chronic | Status: ACTIVE | Noted: 2021-02-17

## 2023-02-13 PROBLEM — G56.00 CARPAL TUNNEL SYNDROME, UNSPECIFIED UPPER LIMB: Chronic | Status: ACTIVE | Noted: 2022-09-04

## 2023-02-13 PROBLEM — K29.70 GASTRITIS, UNSPECIFIED, WITHOUT BLEEDING: Chronic | Status: ACTIVE | Noted: 2021-02-17

## 2023-02-15 ENCOUNTER — APPOINTMENT (OUTPATIENT)
Dept: INTERNAL MEDICINE | Facility: CLINIC | Age: 58
End: 2023-02-15
Payer: MEDICAID

## 2023-02-15 VITALS
HEIGHT: 61 IN | TEMPERATURE: 97.6 F | OXYGEN SATURATION: 97 % | WEIGHT: 163 LBS | HEART RATE: 68 BPM | DIASTOLIC BLOOD PRESSURE: 65 MMHG | BODY MASS INDEX: 30.78 KG/M2 | SYSTOLIC BLOOD PRESSURE: 121 MMHG

## 2023-02-15 DIAGNOSIS — K29.70 GASTRITIS, UNSPECIFIED, W/OUT BLEEDING: ICD-10-CM

## 2023-02-15 DIAGNOSIS — Z00.00 ENCOUNTER FOR GENERAL ADULT MEDICAL EXAMINATION W/OUT ABNORMAL FINDINGS: ICD-10-CM

## 2023-02-15 DIAGNOSIS — E78.5 HYPERLIPIDEMIA, UNSPECIFIED: ICD-10-CM

## 2023-02-15 PROCEDURE — 99396 PREV VISIT EST AGE 40-64: CPT | Mod: 25

## 2023-02-15 PROCEDURE — T1013: CPT

## 2023-02-16 NOTE — INTERPRETER SERVICES
[[employer] ] : provided by [employer]   [Time Spent: ____ minutes] : Total time spent using  services: [unfilled] minutes. The patient's primary language is not English thus required  services. [Interpreters_IDNumber] : [id number] [Interpreters_FullName] : J [TWNoteComboBox1] : [place]

## 2023-02-16 NOTE — ADDENDUM
[FreeTextEntry1] : I, Nehemias Ferreira, acted as a scribe on behalf of Dr. Roger Leon MD, on 02/15/2023. \par \par All medical entries made by the scribe were at my, Dr. Roger Leon MD, direction and personally dictated by me on 02/15/2023. I have reviewed the chart and agree that the record accurately reflects my personal performance of the history, physical exam, assessment and plan. I have also personally directed, reviewed, and agreed with the chart.

## 2023-02-16 NOTE — ASSESSMENT
[FreeTextEntry1] : Annual Physical Exam\par -Blood work done today.\par -Check A1c, lipid panel and Vitamin levels.\par -BP is stable. Continue current management.\par -Omeprazole sent to Pharmacy.\par -Continue with Healthy diet.\par -Mammogram ordered.\par -RTO annually or as needed

## 2023-02-16 NOTE — INTERPRETER SERVICES
[[employer] ] : provided by [employer]   [Time Spent: ____ minutes] : Total time spent using  services: [unfilled] minutes. The patient's primary language is not English thus required  services. [Interpreters_IDNumber] : [id number] [Interpreters_FullName] : J [TWNoteComboBox1] : [place]ian

## 2023-02-16 NOTE — HISTORY OF PRESENT ILLNESS
[FreeTextEntry1] : Patient presents for annual physical exam. [de-identified] : Patient is doing well overall and has not gotten sick since last visit.\par Wishes to check cholesterol levels.\par \par Pt continues to have carpal tunnel pain and has been using wrist splints. Did have EMG done.\par Denies any CP, Chest tightness, SOB or LE edema.\par Denies any abdominal pain, urinary symptom or change in bowel habits.\par Denies any fever, night sweats, or chills.\par \par Also notes of thumb nail thickening. She has been using gloves at work.\par Pt has not gone for mammogram.

## 2023-02-16 NOTE — HISTORY OF PRESENT ILLNESS
[FreeTextEntry1] : Patient presents for annual physical exam. [de-identified] : Patient is doing well overall and has not gotten sick since last visit.\par Wishes to check cholesterol levels.\par \par Pt continues to have carpal tunnel pain and has been using wrist splints. Did have EMG done.\par Denies any CP, Chest tightness, SOB or LE edema.\par Denies any abdominal pain, urinary symptom or change in bowel habits.\par Denies any fever, night sweats, or chills.\par \par Also notes of thumb nail thickening. She has been using gloves at work.\par Pt has not gone for mammogram.

## 2023-02-16 NOTE — HEALTH RISK ASSESSMENT
[Good] : ~his/her~  mood as  good [No] : In the past 12 months have you used drugs other than those required for medical reasons? No [FreeTextEntry1] : Health Maintenance [MammogramComments] : Ordered

## 2023-02-20 ENCOUNTER — APPOINTMENT (OUTPATIENT)
Dept: ORTHOPEDIC SURGERY | Facility: CLINIC | Age: 58
End: 2023-02-20
Payer: MEDICAID

## 2023-02-20 DIAGNOSIS — G56.03 CARPAL TUNNEL SYNDROM,BILATERAL UPPER LIMBS: ICD-10-CM

## 2023-02-20 LAB
25(OH)D3 SERPL-MCNC: 18.4 NG/ML
ALBUMIN SERPL ELPH-MCNC: 4.4 G/DL
ALP BLD-CCNC: 102 U/L
ALT SERPL-CCNC: 37 U/L
ANION GAP SERPL CALC-SCNC: 14 MMOL/L
APPEARANCE: ABNORMAL
AST SERPL-CCNC: 31 U/L
BACTERIA: ABNORMAL
BASOPHILS # BLD AUTO: 0.03 K/UL
BASOPHILS NFR BLD AUTO: 0.5 %
BILIRUB SERPL-MCNC: 0.7 MG/DL
BILIRUBIN URINE: NEGATIVE
BLOOD URINE: ABNORMAL
BUN SERPL-MCNC: 21 MG/DL
CALCIUM SERPL-MCNC: 9.3 MG/DL
CHLORIDE SERPL-SCNC: 106 MMOL/L
CHOLEST SERPL-MCNC: 207 MG/DL
CO2 SERPL-SCNC: 21 MMOL/L
COLOR: YELLOW
CREAT SERPL-MCNC: 0.57 MG/DL
EGFR: 105 ML/MIN/1.73M2
EOSINOPHIL # BLD AUTO: 0.04 K/UL
EOSINOPHIL NFR BLD AUTO: 0.7 %
ESTIMATED AVERAGE GLUCOSE: 126 MG/DL
GLUCOSE QUALITATIVE U: NEGATIVE
GLUCOSE SERPL-MCNC: 93 MG/DL
HBA1C MFR BLD HPLC: 6 %
HCT VFR BLD CALC: 38.4 %
HDLC SERPL-MCNC: 62 MG/DL
HGB BLD-MCNC: 12.7 G/DL
HYALINE CASTS: 0 /LPF
IMM GRANULOCYTES NFR BLD AUTO: 0.3 %
KETONES URINE: NEGATIVE
LDLC SERPL CALC-MCNC: 126 MG/DL
LEUKOCYTE ESTERASE URINE: NEGATIVE
LYMPHOCYTES # BLD AUTO: 1.83 K/UL
LYMPHOCYTES NFR BLD AUTO: 31.7 %
MAN DIFF?: NORMAL
MCHC RBC-ENTMCNC: 30.2 PG
MCHC RBC-ENTMCNC: 33.1 GM/DL
MCV RBC AUTO: 91.4 FL
MICROSCOPIC-UA: NORMAL
MONOCYTES # BLD AUTO: 0.35 K/UL
MONOCYTES NFR BLD AUTO: 6.1 %
NEUTROPHILS # BLD AUTO: 3.51 K/UL
NEUTROPHILS NFR BLD AUTO: 60.7 %
NITRITE URINE: NEGATIVE
NONHDLC SERPL-MCNC: 145 MG/DL
PH URINE: 6
PLATELET # BLD AUTO: 282 K/UL
POTASSIUM SERPL-SCNC: 3.8 MMOL/L
PROT SERPL-MCNC: 7 G/DL
PROTEIN URINE: NEGATIVE
RBC # BLD: 4.2 M/UL
RBC # FLD: 13.2 %
RED BLOOD CELLS URINE: 52 /HPF
SODIUM SERPL-SCNC: 141 MMOL/L
SPECIFIC GRAVITY URINE: >=1.03
SQUAMOUS EPITHELIAL CELLS: 5 /HPF
TRIGL SERPL-MCNC: 94 MG/DL
TSH SERPL-ACNC: 0.77 UIU/ML
URINE COMMENTS: NORMAL
UROBILINOGEN URINE: NORMAL
VIT B12 SERPL-MCNC: 1092 PG/ML
WBC # FLD AUTO: 5.78 K/UL
WHITE BLOOD CELLS URINE: 5 /HPF

## 2023-02-20 PROCEDURE — 99214 OFFICE O/P EST MOD 30 MIN: CPT

## 2023-02-20 RX ORDER — MELOXICAM 15 MG/1
15 TABLET ORAL
Qty: 30 | Refills: 1 | Status: ACTIVE | COMMUNITY
Start: 2023-02-20 | End: 1900-01-01

## 2023-02-20 NOTE — PHYSICAL EXAM
[de-identified] : Patient is WDWN, alert, and in no acute distress. Breathing is unlabored. She is grossly oriented to person, place, and time.\par \par Right Wrist: \par No tenderness, edema, or deformities. No thenar atrophy. Full ROM with decreased sensation along median nerve distribution. \par Tests/Signs: Tinel's sign is negative over carpal tunnel, Phalen's test is positive. \par \par Left Wrist:\par No tenderness, edema, or deformities. No thenar atrophy. Full ROM with decreased sensation along median nerve distribution. \par Tests/Signs: Tinel's sign is negative over carpal tunnel, Phalen's test is positive.  [de-identified] : EMGs FROM 02/07/2023 REVIEWED\par IMPRESSION:\par Patient has moderate demyelinating sensorimotor median mononeuropathy at the wrist (carpal tunnel syndrome ) in her right hand, as evidenced by prolonged median motor latency, prolonged median sensory peak latency and decreased median sensory conduction velocity. Patient also has evidence of mild demyelinating sensory median mononeuropathy at the wrist in her left hand as evidenced by decreased median sensory conduction velocity and prolonged latency. There was no evidence of bilateral ulnar mononeuropathy at the elbow. \par \par SIGNED BY: Jeancarlos Robbins M.D.

## 2023-02-20 NOTE — DISCUSSION/SUMMARY
[FreeTextEntry1] : The underlying pathophysiology was reviewed with the patient. EMGs were reviewed with the patient. Discussed at length the nature of the patient’s condition. The bilateral hand/wrist symptoms appear secondary to carpal tunnel syndrome.\par \par At this time, the EMGs were reviewed in great detail with the patient. We discussed treatment options including cortisone injection, surgical intervention - left carpal tunnel release. The risks and benefits were reviewed with the patient. All of her questions were answered. She will meet with our surgical scheduler.\par \par All questions answered, understanding verbalized. Patient in agreement with plan of care. Follow up in \par

## 2023-02-20 NOTE — ADDENDUM
[FreeTextEntry1] : IKar wrote this note acting as a scribe for Dr. Ramos Henderson MD on Feb 20, 2023.\par \par Ramos MARTINEZ MD, ordering physician, have read and attest that all the information, medical decision making and discharge instructions within are true and accurate on 02/20/2023.

## 2023-02-20 NOTE — HISTORY OF PRESENT ILLNESS
[Right] : right hand dominant [FreeTextEntry1] : Pt is a 56 y/o female with bilateral hand/wrist numbness and tingling x 1-1/2 years. She was initially seen in the office on 8/1/22 at which time she had complaints consistent with bilateral carpal tunnel syndrome. She had tried bracing with moderate relief. At that time, was prescribed Meloxicam 15 mg and referred for an EMG as well as hand therapy. She returns on 2/20/23 to review the EMG results and discuss further treatment recommendations. 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 She states there are symptoms equal on both sides. She states the pain wakes her up at night. She states she has been utilizing a night time brace for the pain.

## 2023-06-21 ENCOUNTER — APPOINTMENT (OUTPATIENT)
Dept: INTERNAL MEDICINE | Facility: CLINIC | Age: 58
End: 2023-06-21
Payer: MEDICAID

## 2023-06-21 VITALS
SYSTOLIC BLOOD PRESSURE: 116 MMHG | OXYGEN SATURATION: 98 % | BODY MASS INDEX: 31.15 KG/M2 | HEART RATE: 63 BPM | WEIGHT: 165 LBS | TEMPERATURE: 97.7 F | DIASTOLIC BLOOD PRESSURE: 70 MMHG | HEIGHT: 61 IN

## 2023-06-21 DIAGNOSIS — J40 BRONCHITIS, NOT SPECIFIED AS ACUTE OR CHRONIC: ICD-10-CM

## 2023-06-21 PROCEDURE — 99213 OFFICE O/P EST LOW 20 MIN: CPT

## 2023-06-21 RX ORDER — BENZONATATE 100 MG/1
100 CAPSULE ORAL
Qty: 21 | Refills: 0 | Status: ACTIVE | COMMUNITY
Start: 2023-06-21 | End: 1900-01-01

## 2023-06-21 RX ORDER — AZITHROMYCIN 250 MG/1
250 TABLET, FILM COATED ORAL
Qty: 1 | Refills: 0 | Status: ACTIVE | COMMUNITY
Start: 2023-06-21 | End: 1900-01-01

## 2023-06-21 RX ORDER — PREDNISONE 20 MG/1
20 TABLET ORAL DAILY
Qty: 10 | Refills: 0 | Status: ACTIVE | COMMUNITY
Start: 2023-06-21 | End: 1900-01-01

## 2023-06-21 NOTE — HISTORY OF PRESENT ILLNESS
[FreeTextEntry8] : Patient c/o productive cough a/w nasal congestion for 12 days. Took pills with no relief.\par Does have difficulty breathing.\par Denies any fatigue, fever, body aches, diarrhea, vomiting.\par Denies any Hx of Asthma.\par \par Son was sick and is currently better, however some work mates were coughing.

## 2023-06-21 NOTE — REVIEW OF SYSTEMS
[Cough] : cough [Negative] : Heme/Lymph [FreeTextEntry4] : Nasal congestion [FreeTextEntry6] : Difficulty breathing

## 2023-06-21 NOTE — ASSESSMENT
[FreeTextEntry1] : -Respiratory panel ordered-\par -Given duration of symptoms\par -Z-leslie, Prednisone and antitussives sent to pharmacy\par If no improvement to call office. Pt verbalized understanding.

## 2023-06-21 NOTE — ADDENDUM
[FreeTextEntry1] : I, Nehemias Ferreira, acted as a scribe on behalf of Dr. Roegr Leon MD, on 06/21/2023. \par \par All medical entries made by the scribe were at my, Dr. Roger Leon MD, direction and personally dictated by me on 06/21/2023. I have reviewed the chart and agree that the record accurately reflects my personal performance of the history, physical exam, assessment and plan. I have also personally directed, reviewed, and agreed with the chart.

## 2023-06-21 NOTE — PHYSICAL EXAM
[Normal] : normal rate, regular rhythm, normal S1 and S2 and no murmur heard [de-identified] : Mild rhonchi Bilaterally

## 2023-06-21 NOTE — INTERPRETER SERVICES
[[employer] ] : provided by [employer]   [Interpreters_IDNumber] : [id number] [Interpreters_FullName] : Audra

## 2023-06-22 ENCOUNTER — NON-APPOINTMENT (OUTPATIENT)
Age: 58
End: 2023-06-22

## 2023-06-22 LAB
RAPID RVP RESULT: NOT DETECTED
SARS-COV-2 RNA PNL RESP NAA+PROBE: NOT DETECTED

## 2023-08-03 ENCOUNTER — APPOINTMENT (OUTPATIENT)
Dept: INTERNAL MEDICINE | Facility: CLINIC | Age: 58
End: 2023-08-03
Payer: MEDICAID

## 2023-08-03 VITALS
SYSTOLIC BLOOD PRESSURE: 119 MMHG | OXYGEN SATURATION: 96 % | BODY MASS INDEX: 31.53 KG/M2 | HEART RATE: 60 BPM | WEIGHT: 167 LBS | TEMPERATURE: 97.9 F | HEIGHT: 61 IN | DIASTOLIC BLOOD PRESSURE: 71 MMHG

## 2023-08-03 PROCEDURE — 99213 OFFICE O/P EST LOW 20 MIN: CPT

## 2023-08-03 RX ORDER — LIDOCAINE 40 MG/G
4 PATCH TOPICAL
Qty: 3 | Refills: 3 | Status: ACTIVE | COMMUNITY
Start: 2023-08-03 | End: 1900-01-01

## 2023-08-03 NOTE — PHYSICAL EXAM
[Normal] : no acute distress, well nourished, well developed and well-appearing [de-identified] : Limited Shoulder Internal Rotation. Greene and Neer's test positive

## 2023-08-03 NOTE — HISTORY OF PRESENT ILLNESS
[FreeTextEntry1] : Patient presents for follow-up.  [de-identified] : Patient c/o L shoulder pain making her difficult to lift objects.  Requesting for lidocaine patch and anti-inflammatory. Denies any neck pain, weakness or numbness Wishes to undergo Physical Therapy.

## 2023-08-03 NOTE — ADDENDUM
[FreeTextEntry1] : I, Nehemias Ferreira, acted as a scribe on behalf of Dr. Roger Leon MD, on 08/03/2023.   All medical entries made by the scribe were at my, Dr. Roger Leon MD, direction and personally dictated by me on 08/03/2023. I have reviewed the chart and agree that the record accurately reflects my personal performance of the history, physical exam, assessment and plan. I have also personally directed, reviewed, and agreed with the chart.

## 2023-08-03 NOTE — ASSESSMENT
[FreeTextEntry1] : Rotator Cuff tendonitis. -BP is stable -Lidocaine patch and Meloxicam sent to pharmacy. -Physical Therapy referral provided.

## 2023-08-18 ENCOUNTER — RX RENEWAL (OUTPATIENT)
Age: 58
End: 2023-08-18

## 2023-08-31 ENCOUNTER — RX RENEWAL (OUTPATIENT)
Age: 58
End: 2023-08-31

## 2023-08-31 RX ORDER — MELOXICAM 7.5 MG/1
7.5 TABLET ORAL
Qty: 30 | Refills: 0 | Status: ACTIVE | COMMUNITY
Start: 2023-08-03 | End: 1900-01-01

## 2023-11-18 ENCOUNTER — NON-APPOINTMENT (OUTPATIENT)
Age: 58
End: 2023-11-18

## 2023-12-09 ENCOUNTER — EMERGENCY (EMERGENCY)
Facility: HOSPITAL | Age: 58
LOS: 1 days | Discharge: HOME | End: 2023-12-09
Attending: STUDENT IN AN ORGANIZED HEALTH CARE EDUCATION/TRAINING PROGRAM
Payer: COMMERCIAL

## 2023-12-09 VITALS
TEMPERATURE: 98 F | DIASTOLIC BLOOD PRESSURE: 81 MMHG | HEIGHT: 64 IN | WEIGHT: 154.98 LBS | OXYGEN SATURATION: 97 % | RESPIRATION RATE: 16 BRPM | SYSTOLIC BLOOD PRESSURE: 144 MMHG | HEART RATE: 80 BPM

## 2023-12-09 VITALS
SYSTOLIC BLOOD PRESSURE: 126 MMHG | RESPIRATION RATE: 16 BRPM | DIASTOLIC BLOOD PRESSURE: 72 MMHG | TEMPERATURE: 99 F | OXYGEN SATURATION: 95 % | HEART RATE: 68 BPM

## 2023-12-09 LAB
ALBUMIN SERPL ELPH-MCNC: 4 G/DL — SIGNIFICANT CHANGE UP (ref 3.3–5)
ALBUMIN SERPL ELPH-MCNC: 4 G/DL — SIGNIFICANT CHANGE UP (ref 3.3–5)
ALP SERPL-CCNC: 103 U/L — SIGNIFICANT CHANGE UP (ref 40–120)
ALP SERPL-CCNC: 103 U/L — SIGNIFICANT CHANGE UP (ref 40–120)
ALT FLD-CCNC: 41 U/L — SIGNIFICANT CHANGE UP (ref 10–45)
ALT FLD-CCNC: 41 U/L — SIGNIFICANT CHANGE UP (ref 10–45)
ANION GAP SERPL CALC-SCNC: 10 MMOL/L — SIGNIFICANT CHANGE UP (ref 5–17)
ANION GAP SERPL CALC-SCNC: 10 MMOL/L — SIGNIFICANT CHANGE UP (ref 5–17)
APTT BLD: 26.6 SEC — SIGNIFICANT CHANGE UP (ref 24.5–35.6)
APTT BLD: 26.6 SEC — SIGNIFICANT CHANGE UP (ref 24.5–35.6)
AST SERPL-CCNC: 55 U/L — HIGH (ref 10–40)
AST SERPL-CCNC: 55 U/L — HIGH (ref 10–40)
BASOPHILS # BLD AUTO: 0.03 K/UL — SIGNIFICANT CHANGE UP (ref 0–0.2)
BASOPHILS # BLD AUTO: 0.03 K/UL — SIGNIFICANT CHANGE UP (ref 0–0.2)
BASOPHILS NFR BLD AUTO: 0.6 % — SIGNIFICANT CHANGE UP (ref 0–2)
BASOPHILS NFR BLD AUTO: 0.6 % — SIGNIFICANT CHANGE UP (ref 0–2)
BILIRUB SERPL-MCNC: 1 MG/DL — SIGNIFICANT CHANGE UP (ref 0.2–1.2)
BILIRUB SERPL-MCNC: 1 MG/DL — SIGNIFICANT CHANGE UP (ref 0.2–1.2)
BUN SERPL-MCNC: 11 MG/DL — SIGNIFICANT CHANGE UP (ref 7–23)
BUN SERPL-MCNC: 11 MG/DL — SIGNIFICANT CHANGE UP (ref 7–23)
CALCIUM SERPL-MCNC: 9.2 MG/DL — SIGNIFICANT CHANGE UP (ref 8.4–10.5)
CALCIUM SERPL-MCNC: 9.2 MG/DL — SIGNIFICANT CHANGE UP (ref 8.4–10.5)
CHLORIDE SERPL-SCNC: 105 MMOL/L — SIGNIFICANT CHANGE UP (ref 96–108)
CHLORIDE SERPL-SCNC: 105 MMOL/L — SIGNIFICANT CHANGE UP (ref 96–108)
CO2 SERPL-SCNC: 24 MMOL/L — SIGNIFICANT CHANGE UP (ref 22–31)
CO2 SERPL-SCNC: 24 MMOL/L — SIGNIFICANT CHANGE UP (ref 22–31)
CREAT SERPL-MCNC: 0.5 MG/DL — SIGNIFICANT CHANGE UP (ref 0.5–1.3)
CREAT SERPL-MCNC: 0.5 MG/DL — SIGNIFICANT CHANGE UP (ref 0.5–1.3)
EGFR: 109 ML/MIN/1.73M2 — SIGNIFICANT CHANGE UP
EGFR: 109 ML/MIN/1.73M2 — SIGNIFICANT CHANGE UP
EOSINOPHIL # BLD AUTO: 0.16 K/UL — SIGNIFICANT CHANGE UP (ref 0–0.5)
EOSINOPHIL # BLD AUTO: 0.16 K/UL — SIGNIFICANT CHANGE UP (ref 0–0.5)
EOSINOPHIL NFR BLD AUTO: 3.2 % — SIGNIFICANT CHANGE UP (ref 0–6)
EOSINOPHIL NFR BLD AUTO: 3.2 % — SIGNIFICANT CHANGE UP (ref 0–6)
FLUAV AG NPH QL: SIGNIFICANT CHANGE UP
FLUAV AG NPH QL: SIGNIFICANT CHANGE UP
FLUBV AG NPH QL: SIGNIFICANT CHANGE UP
FLUBV AG NPH QL: SIGNIFICANT CHANGE UP
GLUCOSE SERPL-MCNC: 97 MG/DL — SIGNIFICANT CHANGE UP (ref 70–99)
GLUCOSE SERPL-MCNC: 97 MG/DL — SIGNIFICANT CHANGE UP (ref 70–99)
HCT VFR BLD CALC: 39.3 % — SIGNIFICANT CHANGE UP (ref 34.5–45)
HCT VFR BLD CALC: 39.3 % — SIGNIFICANT CHANGE UP (ref 34.5–45)
HGB BLD-MCNC: 13 G/DL — SIGNIFICANT CHANGE UP (ref 11.5–15.5)
HGB BLD-MCNC: 13 G/DL — SIGNIFICANT CHANGE UP (ref 11.5–15.5)
IMM GRANULOCYTES NFR BLD AUTO: 0.2 % — SIGNIFICANT CHANGE UP (ref 0–0.9)
IMM GRANULOCYTES NFR BLD AUTO: 0.2 % — SIGNIFICANT CHANGE UP (ref 0–0.9)
INR BLD: 1.07 RATIO — SIGNIFICANT CHANGE UP (ref 0.85–1.18)
INR BLD: 1.07 RATIO — SIGNIFICANT CHANGE UP (ref 0.85–1.18)
LYMPHOCYTES # BLD AUTO: 1.24 K/UL — SIGNIFICANT CHANGE UP (ref 1–3.3)
LYMPHOCYTES # BLD AUTO: 1.24 K/UL — SIGNIFICANT CHANGE UP (ref 1–3.3)
LYMPHOCYTES # BLD AUTO: 24.6 % — SIGNIFICANT CHANGE UP (ref 13–44)
LYMPHOCYTES # BLD AUTO: 24.6 % — SIGNIFICANT CHANGE UP (ref 13–44)
MCHC RBC-ENTMCNC: 30 PG — SIGNIFICANT CHANGE UP (ref 27–34)
MCHC RBC-ENTMCNC: 30 PG — SIGNIFICANT CHANGE UP (ref 27–34)
MCHC RBC-ENTMCNC: 33.1 GM/DL — SIGNIFICANT CHANGE UP (ref 32–36)
MCHC RBC-ENTMCNC: 33.1 GM/DL — SIGNIFICANT CHANGE UP (ref 32–36)
MCV RBC AUTO: 90.6 FL — SIGNIFICANT CHANGE UP (ref 80–100)
MCV RBC AUTO: 90.6 FL — SIGNIFICANT CHANGE UP (ref 80–100)
MONOCYTES # BLD AUTO: 0.48 K/UL — SIGNIFICANT CHANGE UP (ref 0–0.9)
MONOCYTES # BLD AUTO: 0.48 K/UL — SIGNIFICANT CHANGE UP (ref 0–0.9)
MONOCYTES NFR BLD AUTO: 9.5 % — SIGNIFICANT CHANGE UP (ref 2–14)
MONOCYTES NFR BLD AUTO: 9.5 % — SIGNIFICANT CHANGE UP (ref 2–14)
NEUTROPHILS # BLD AUTO: 3.12 K/UL — SIGNIFICANT CHANGE UP (ref 1.8–7.4)
NEUTROPHILS # BLD AUTO: 3.12 K/UL — SIGNIFICANT CHANGE UP (ref 1.8–7.4)
NEUTROPHILS NFR BLD AUTO: 61.9 % — SIGNIFICANT CHANGE UP (ref 43–77)
NEUTROPHILS NFR BLD AUTO: 61.9 % — SIGNIFICANT CHANGE UP (ref 43–77)
NRBC # BLD: 0 /100 WBCS — SIGNIFICANT CHANGE UP (ref 0–0)
NRBC # BLD: 0 /100 WBCS — SIGNIFICANT CHANGE UP (ref 0–0)
PLATELET # BLD AUTO: 261 K/UL — SIGNIFICANT CHANGE UP (ref 150–400)
PLATELET # BLD AUTO: 261 K/UL — SIGNIFICANT CHANGE UP (ref 150–400)
POTASSIUM SERPL-MCNC: 5.1 MMOL/L — SIGNIFICANT CHANGE UP (ref 3.5–5.3)
POTASSIUM SERPL-MCNC: 5.1 MMOL/L — SIGNIFICANT CHANGE UP (ref 3.5–5.3)
POTASSIUM SERPL-SCNC: 5.1 MMOL/L — SIGNIFICANT CHANGE UP (ref 3.5–5.3)
POTASSIUM SERPL-SCNC: 5.1 MMOL/L — SIGNIFICANT CHANGE UP (ref 3.5–5.3)
PROT SERPL-MCNC: 7.3 G/DL — SIGNIFICANT CHANGE UP (ref 6–8.3)
PROT SERPL-MCNC: 7.3 G/DL — SIGNIFICANT CHANGE UP (ref 6–8.3)
PROTHROM AB SERPL-ACNC: 11.8 SEC — SIGNIFICANT CHANGE UP (ref 9.5–13)
PROTHROM AB SERPL-ACNC: 11.8 SEC — SIGNIFICANT CHANGE UP (ref 9.5–13)
RBC # BLD: 4.34 M/UL — SIGNIFICANT CHANGE UP (ref 3.8–5.2)
RBC # BLD: 4.34 M/UL — SIGNIFICANT CHANGE UP (ref 3.8–5.2)
RBC # FLD: 13.3 % — SIGNIFICANT CHANGE UP (ref 10.3–14.5)
RBC # FLD: 13.3 % — SIGNIFICANT CHANGE UP (ref 10.3–14.5)
RSV RNA NPH QL NAA+NON-PROBE: SIGNIFICANT CHANGE UP
RSV RNA NPH QL NAA+NON-PROBE: SIGNIFICANT CHANGE UP
SARS-COV-2 RNA SPEC QL NAA+PROBE: SIGNIFICANT CHANGE UP
SARS-COV-2 RNA SPEC QL NAA+PROBE: SIGNIFICANT CHANGE UP
SODIUM SERPL-SCNC: 139 MMOL/L — SIGNIFICANT CHANGE UP (ref 135–145)
SODIUM SERPL-SCNC: 139 MMOL/L — SIGNIFICANT CHANGE UP (ref 135–145)
WBC # BLD: 5.04 K/UL — SIGNIFICANT CHANGE UP (ref 3.8–10.5)
WBC # BLD: 5.04 K/UL — SIGNIFICANT CHANGE UP (ref 3.8–10.5)
WBC # FLD AUTO: 5.04 K/UL — SIGNIFICANT CHANGE UP (ref 3.8–10.5)
WBC # FLD AUTO: 5.04 K/UL — SIGNIFICANT CHANGE UP (ref 3.8–10.5)

## 2023-12-09 PROCEDURE — 80053 COMPREHEN METABOLIC PANEL: CPT

## 2023-12-09 PROCEDURE — 93005 ELECTROCARDIOGRAM TRACING: CPT

## 2023-12-09 PROCEDURE — 85610 PROTHROMBIN TIME: CPT

## 2023-12-09 PROCEDURE — 71046 X-RAY EXAM CHEST 2 VIEWS: CPT | Mod: 26

## 2023-12-09 PROCEDURE — 99284 EMERGENCY DEPT VISIT MOD MDM: CPT

## 2023-12-09 PROCEDURE — 71046 X-RAY EXAM CHEST 2 VIEWS: CPT

## 2023-12-09 PROCEDURE — 87637 SARSCOV2&INF A&B&RSV AMP PRB: CPT

## 2023-12-09 PROCEDURE — 85025 COMPLETE CBC W/AUTO DIFF WBC: CPT

## 2023-12-09 PROCEDURE — 85730 THROMBOPLASTIN TIME PARTIAL: CPT

## 2023-12-09 PROCEDURE — 99285 EMERGENCY DEPT VISIT HI MDM: CPT | Mod: 25

## 2023-12-09 PROCEDURE — 96374 THER/PROPH/DIAG INJ IV PUSH: CPT

## 2023-12-09 RX ORDER — GUAIFENESIN/DEXTROMETHORPHAN 600MG-30MG
2 TABLET, EXTENDED RELEASE 12 HR ORAL
Qty: 36 | Refills: 0
Start: 2023-12-09 | End: 2023-12-11

## 2023-12-09 RX ORDER — KETOROLAC TROMETHAMINE 30 MG/ML
15 SYRINGE (ML) INJECTION ONCE
Refills: 0 | Status: DISCONTINUED | OUTPATIENT
Start: 2023-12-09 | End: 2023-12-09

## 2023-12-09 RX ADMIN — Medication 15 MILLIGRAM(S): at 11:38

## 2023-12-09 RX ADMIN — Medication 15 MILLIGRAM(S): at 15:26

## 2023-12-09 RX ADMIN — Medication 100 MILLIGRAM(S): at 11:40

## 2023-12-09 NOTE — ED PROVIDER NOTE - ATTENDING APP SHARED VISIT CONTRIBUTION OF CARE
Attending MD EVELINA Andrade- This was a shared visit with TA.  I have reviewed and discussed the case with the TA and agree with verified documentation unless otherwise documented.  I have independently spoken with and examined the patient and my documentation of history/physical exam and MDM are as follows:    57 F with PMH gastritis presenting to ED with cough x 1 week associated with congestion, sore throat, body aches, and left upper back pain that is worse with coughing.  Also noting anterior chest pain that is only present while coughing; nonexertional and nonpleuritic.  Will no sick contacts but + recent travel from [place].  No fever, shortness of breath, abdominal pain, GI symptoms, dysuria.  On exam, patient no acute distress, heart lungs clear to auscultation, abdomen soft nontender, no pedal edema.    MDM–otherwise generally healthy 57 F presenting with symptoms most concerning for viral URI versus pneumonia.  EKG shows normal sinus rhythm rate 71, normal intervals, no ischemic changes.

## 2023-12-09 NOTE — ED PROVIDER NOTE - NSFOLLOWUPINSTRUCTIONS_ED_ALL_ED_FT
1. Please follow up with your Primary Care Doctor after discharge, bring a copy of your results to follow up appointment for review     2. Please rest, stay hydrated and continue all at home medications as previously prescribed    3. We have sent a medication to your pharmacy to help with cough called dextromethorphan-guaifenesin, please take as needed for cough every 4 hours     4. For continued or recurrent pain recommend taking over the counter Tylenol (acetaminophen) 1000 mg every 6-8 hours as needed and/or over the counter Motrin (Ibuprofen/Advil) 600mg every 6 hours as needed    5. Return to ED for any new or worsened symptoms of concern

## 2023-12-09 NOTE — ED ADULT NURSE NOTE - NSFALLUNIVINTERV_ED_ALL_ED
Bed/Stretcher in lowest position, wheels locked, appropriate side rails in place/Call bell, personal items and telephone in reach/Instruct patient to call for assistance before getting out of bed/chair/stretcher/Non-slip footwear applied when patient is off stretcher/[place] to call system/Physically safe environment - no spills, clutter or unnecessary equipment/Purposeful proactive rounding/Room/bathroom lighting operational, light cord in reach Bed/Stretcher in lowest position, wheels locked, appropriate side rails in place/Call bell, personal items and telephone in reach/Instruct patient to call for assistance before getting out of bed/chair/stretcher/Non-slip footwear applied when patient is off stretcher/[place] to call system/Physically safe environment - no spills, clutter or unnecessary equipment/Purposeful proactive rounding/Room/bathroom lighting operational, light cord in reach

## 2023-12-09 NOTE — ED PROVIDER NOTE - PROGRESS NOTE DETAILS
Reviewed without acute concerning findings chest x-ray without signs of pneumonia.  Patient with some improvement of pain after medications in the emergency department.  Requesting medication to help bring up cough prescription sent for cough suppressant/expectorant.  Will also give patient work note and encouraged additional supportive care at home.  Jennifer Joe PA-C

## 2023-12-09 NOTE — ED ADULT NURSE NOTE - OBJECTIVE STATEMENT
56 y/o female PMHx arrives to [facility] ED by car from home with son Darius with c/o multiple medical complaints. Pt endorse cough x 3 days, throat pain x 5 days and upper back pain x 2 weeks. 58 y/o female PMHx arrives to [facility] ED by car from home with son Darius with c/o multiple medical complaints. Pt endorse cough x 3 days, throat pain x 5 days and upper back pain x 2 weeks. 56 y/o female PMHx gastritis, HLD, carpal tunnel, tendonitis arrives to [facility] ED by car from home with son Darius with c/o multiple medical complaints. Pt endorse dry cough x 3 days, throat pain x 5 days and constant left upper back pain x 2 weeks. Patient taking dayquil and nyquil x 2 days with minimal relief. Patient had similar symptoms 1 month ago in [place] and took Guafenesin with dextromethorphan with improvement, but never had upper back pain. Denies falls/trauma or heavy lifting. Patient is A&Ox4. Respirations spontaneous and unlabored, +cough. Denies SOB, CP, abdominal pain, n/v/d, urinary symptoms, fever/chills. Skin intact. Ambulates independently at baseline. 58 y/o female PMHx gastritis, HLD, carpal tunnel, tendonitis arrives to [facility] ED by car from home with son Darius with c/o multiple medical complaints. Pt endorse dry cough x 3 days, throat pain x 5 days and constant left upper back pain x 2 weeks. Patient taking dayquil and nyquil x 2 days with minimal relief. Patient had similar symptoms 1 month ago in [place] and took Guafenesin with dextromethorphan with improvement, but never had upper back pain. Denies falls/trauma or heavy lifting. Patient is A&Ox4. Respirations spontaneous and unlabored, +cough. Denies SOB, CP, abdominal pain, n/v/d, urinary symptoms, fever/chills. Skin intact. Ambulates independently at baseline.

## 2023-12-09 NOTE — ED PROVIDER NOTE - NEURO NEGATIVE STATEMENT, MLM
Neuro Office Visit      Encounter Date: 2021   Patient Name: Alfa Altamirano  : 1973   MRN: [id number]     Chief Complaint:    Chief Complaint   Patient presents with   • Peripheral Neuropathy       History of Present Illness: Alfa Altamirano is a 47 y.o. male who is here today in Neurology for numbness and abnormal MRI of brain.  Wife contributes to the history via cell phone.    Neuropathy  Complains of needles in feet for years as he has had uncontrolled diabetes for more than 20 years. In last few months pins and needles sensation has spread to entire body. Has decreased sensation with pins and needles from the neck down. Now it is painful in arms, hands, legs and feet.  Feet are clumsy and he is falling once a week. Dropping items.  Last A1c 9.2. Takes GBP once in afternoon. It was effective but caused lethargy.     PCP did EMG which showed polyneuropathy with axonal and demyelinating features.  SCANNED EMG (2021)    Altered mental status  4-5 months ago noticed decreased memory, and confusion. Remembers family members. 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 Has not been lost driving. 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 Wife reports he forgets important conversations. She feels like he zones out.  MRI Brain With & Without Contrast (2021 08:44)-WM changes      Has had dizzy spells and has to lay down. No slurred speech. No dysphagia. Losing weight due to decreased appetite and diabetes meds. No incontinence but dribbles after emptying bladder. Not heat sensitive. Blurred vision in OU. Seeing retinal specialists for diabetic retinopathy. No injections. No diplopia.  No MS hug.    MDD  Moods are unstable. Has depression and is seeing a psychiatrist and a counselor. Taking atarax, abilify, prozac. Denies suicidal ideation.       Subjective      Past Medical History:   Past Medical History:   Diagnosis Date   • Depression    • Diabetes mellitus  (CMS/formerly [facility])        Past Surgical History: History reviewed. No pertinent surgical history.    Family History:   Family History   Problem Relation Age of Onset   • Breast cancer Mother    • Alzheimer's disease Father    • Coronary artery disease Father    • Diabetes Sister    • Depression Daughter        Social History:   Social History     Socioeconomic History   • Marital status:      Spouse name: Not on file   • Number of children: Not on file   • Years of education: Not on file   • Highest education level: Not on file   Tobacco Use   • Smoking status: Former Smoker     Quit date:      Years since quittin.7   • Smokeless tobacco: Never Used   Substance and Sexual Activity   • Alcohol use: Never   • Drug use: Never       Medications:     Current Outpatient Medications:   •  ARIPiprazole (ABILIFY) 2 MG tablet, , Disp: , Rfl:   •  FLUoxetine (PROzac) 10 MG capsule, Take 10 mg by mouth Daily., Disp: , Rfl:   •  hydrOXYzine (ATARAX) 10 MG tablet, , Disp: , Rfl:   •  Jardiance 25 MG tablet tablet, , Disp: , Rfl:   •  lisinopril (PRINIVIL,ZESTRIL) 10 MG tablet, Take 10 mg by mouth Daily., Disp: , Rfl:   •  pioglitazone (ACTOS) 30 MG tablet, , Disp: , Rfl:   •  rosuvastatin (CRESTOR) 10 MG tablet, Take 10 mg by mouth Daily., Disp: , Rfl:   •  SITagliptin (JANUVIA) 100 MG tablet, Take 100 mg by mouth Daily., Disp: , Rfl:   •  Ertugliflozin L-PyroglutamicAc (STEGLATRO) 5 MG tablet, Take 5 mg by mouth Every Morning., Disp: , Rfl:   •  omeprazole (priLOSEC) 20 MG capsule, Take 1 capsule by mouth Daily., Disp: 14 capsule, Rfl: 0  •  ondansetron ODT (ZOFRAN-ODT) 4 MG disintegrating tablet, Take 1 tablet by mouth Every 8 (Eight) Hours As Needed for Nausea., Disp: 12 tablet, Rfl: 0  •  pregabalin (Lyrica) 50 MG capsule, Take 1 capsule by mouth 3 (Three) Times a Day., Disp: 90 capsule, Rfl: 2  •  sucralfate (CARAFATE) 1 g tablet, Take 1 tablet by mouth 4 (Four) Times a Day., Disp: 40 tablet, Rfl: 0    Allergies:   No  Known Allergies    PHQ-9 Total Score:     [facility] Fall Risk Assessment has not been completed.    Objective     Physical Exam:   Physical Exam  Eyes:      Pupils: Pupils are equal, round, and reactive to light.   Neurological:      Mental Status: He is oriented to person, place, and time.      Coordination: Finger-Nose-Finger Test, Heel to Shin Test and Romberg Test normal.      Gait: Gait is intact.      Deep Tendon Reflexes:      Reflex Scores:       Tricep reflexes are 2+ on the right side and 2+ on the left side.       Bicep reflexes are 2+ on the right side and 2+ on the left side.       Brachioradialis reflexes are 2+ on the right side and 2+ on the left side.       Patellar reflexes are 2+ on the right side and 2+ on the left side.       Achilles reflexes are 2+ on the right side and 2+ on the left side.  Psychiatric:         Speech: Speech normal.         Neurologic Exam     Mental Status   Oriented to person, place, and time.   Follows 3 step commands.   Attention: normal. Concentration: normal.   Speech: speech is normal   Level of consciousness: alert  Knowledge: consistent with education.   Normal comprehension.     Cranial Nerves     CN III, IV, VI   Pupils are equal, round, and reactive to light.  Right pupil: Accommodation: intact.   Left pupil: Accommodation: intact.   CN III: no CN III palsy  CN VI: no CN VI palsy  Nystagmus: none   Diplopia: none  Upgaze: normal  Downgaze: normal  Conjugate gaze: present    CN VII   Facial expression full, symmetric.     CN VIII   Hearing: intact    CN XII   CN XII normal.     Motor Exam   Muscle bulk: normal  Overall muscle tone: normal    Strength   Right biceps: 5/5  Left biceps: 5/5  Right triceps: 5/5  Left triceps: 5/5  Right interossei: 5/5  Left interossei: 5/5  Right quadriceps: 5/5  Left quadriceps: 5/5  Right anterior tibial: 5/5  Left anterior tibial: 5/5  Right posterior tibial: 5/5  Left posterior tibial: 5/5    Sensory Exam   Right arm light touch:  "decreased from fingers  Left arm light touch: decreased from fingers  Right leg proprioception: decreased from toes  Left leg proprioception: decreased from toes    Gait, Coordination, and Reflexes     Gait  Gait: normal    Coordination   Romberg: negative  Finger to nose coordination: normal  Heel to shin coordination: normal    Tremor   Resting tremor: absent  Action tremor: absent    Reflexes   Right brachioradialis: 2+  Left brachioradialis: 2+  Right biceps: 2+  Left biceps: 2+  Right triceps: 2+  Left triceps: 2+  Right patellar: 2+  Left patellar: 2+  Right achilles: 2+  Left achilles: 2+  Right : 2+  Left : 2+       Vital Signs:   Vitals:    09/27/21 1447   BP: 124/70   Pulse: 80   Temp: 99.3 °F (37.4 °C)   SpO2: 96%   Weight: 74.5 kg (164 lb 3.2 oz)   Height: 175.3 cm (69\")     Body mass index is 24.25 kg/m².     Results:   Imaging:   MRI Brain With & Without Contrast    Result Date: 9/21/2021  Abnormal contrast enhancement with 2-3 small areas of increased signal and subcortical white matter on the left suggesting chronic small vessel ischemic change. No acute intracranial abnormality identified.   D:  09/20/2021 E:  09/20/2021  This report was finalized on 9/21/2021 4:57 PM by Dr. Neetu Gutierrez MD.         Assessment / Plan      Assessment/Plan:   Diagnoses and all orders for this visit:    1. Demyelinating disease (CMS/HCC) (Primary)  -     CINTHYA by IFA, Reflex 9-biomarkers profile; Future  -     Angiotensin Converting Enzyme; Future  -     Anti-Myelin Oligodendrocyte Glycoprotein (MOG), Serum; Future  -     Antiphosphatidylserine IgG / M; Future  -     Cardiolipin Antibody; Future  -     CBC & Differential; Future  -     Celiac Disease Panel; Future  -     Comprehensive Metabolic Panel; Future  -     Factor 5 Leiden; Future  -     Factor II, DNA Analysis; Future  -     Hemoglobin A1c; Future  -     Myasthenia Gravis Full Panel w/MuSK Reflex; Future  -     Lupus Anticoagulant Panel; Future  -  "    Rheumatoid Factor; Future  -     Sedimentation Rate; Future  -     Sjogren's Antibody, Anti-SS-A / -SS-B; Future  -     Vitamin D 25 Hydroxy; Future  -     CK; Future  -     C-reactive Protein; Future  -     Lyme Disease, Line Blot; Future  -     Immunofixation, Serum; Future  -     Heavy Metals Profile II, Urine - Urine, Clean Catch; Future  -     Rheumatoid Arthritis (RA) Profile; Future  -     TSH; Future  -     Vitamin B12 & Folate; Future  -     Zinc; Future  -     MRI Cervical Spine With & Without Contrast; Future  -     Neuromyelitis Optica (NMO) Auto Antibody, IgG; Future    2. Neuropathy  Comments:  Wean GBP. Start Lyrica  Orders:  -     CINTHYA by IFA, Reflex 9-biomarkers profile; Future  -     pregabalin (Lyrica) 50 MG capsule; Take 1 capsule by mouth 3 (Three) Times a Day.  Dispense: 90 capsule; Refill: 2    3. Gait disorder    4. Altered mental status, unspecified altered mental status type    5. Abnormal finding on MRI of brain    6. High risk medication use  -     Urine Drug Screen - Urine, Clean Catch; Future    7. Moderate episode of recurrent major depressive disorder (HCC)  Comments:  Cont with psychiatry and therapist. Cont Abilify, prozac, hydroxyzine           Patient Education:    As a part of this patient's therapy a controlled substance was prescribed. Instructed on the safe and proper use of this medication along with potential risks. Controlled substance contract signed and scanned. Donis will be reviewed and UDS obtained as indicated.    Reviewed medications, potential side effects and signs and symptoms to report. Discussed risk versus benefits of treatment plan with patient and/or family-including medications, labs and radiology that may be ordered. Addressed questions and concerns during visit. Patient and/or family verbalized understanding and agree with plan. Instructed to call the office with any questions and report to ER with any life-threatening symptoms.     Follow Up:   Return  in about 6 weeks (around 11/8/2021) for Recheck.    During this visit the following were done:  Labs Reviewed [x]    Labs Ordered [x]    Radiology Reports Reviewed [x]    Radiology Ordered [x]    PCP Records Reviewed [x]    Referring Provider Records Reviewed []    ER Records Reviewed []    Hospital Records Reviewed []    History Obtained From Family []    Radiology Images Reviewed [x]    Other Reviewed []    Records Requested []      Braden Santizo, DNP, APRN   no loss of consciousness, no gait abnormality, no headache, no sensory deficits, and no weakness.

## 2023-12-09 NOTE — ED PROVIDER NOTE - PHYSICAL EXAMINATION
CONSTITUTIONAL: Patient is awake, alert and oriented x 3. Patient is well appearing and in no acute distress  HEAD: NCAT  NECK: supple, FROM  LUNGS: CTA b/l, no wheezing or rales   HEART: RRR.+S1S2 no murmurs  ABDOMEN: Soft, non-distended, nttp, no rebound or guarding  EXTREMITY: no edema or calf tenderness b/l, FROM upper and lower ext b/l  SKIN: with no rash or lesions  NEURO: No focal defcits

## 2023-12-09 NOTE — ED PROVIDER NOTE - PATIENT PORTAL LINK FT
You can access the FollowMyHealth Patient Portal offered by [facility] by registering at the following website: [email or web address]. By joining [email or web address]’s FollowMyHealth portal, you will also be able to view your health information using other applications (apps) compatible with our system. You can access the FollowMyHealth Patient Portal offered by [facility] by registering at the following website: [email or web address]. By joining [email or web address]’s FollowMyHealth portal, you will also be able to view your health information using other applications (apps) compatible with our system.

## 2024-01-24 ENCOUNTER — APPOINTMENT (OUTPATIENT)
Dept: INTERNAL MEDICINE | Facility: CLINIC | Age: 59
End: 2024-01-24
Payer: COMMERCIAL

## 2024-01-24 VITALS
DIASTOLIC BLOOD PRESSURE: 65 MMHG | HEIGHT: 61 IN | RESPIRATION RATE: 16 BRPM | SYSTOLIC BLOOD PRESSURE: 125 MMHG | BODY MASS INDEX: 31.72 KG/M2 | HEART RATE: 74 BPM | WEIGHT: 168 LBS

## 2024-01-24 PROCEDURE — 99213 OFFICE O/P EST LOW 20 MIN: CPT

## 2024-01-24 NOTE — ADDENDUM
[FreeTextEntry1] : I, Jerelbarbara Polina De Leon, acted as a scribe on behalf of Dr. Roger Leon MD, on 01/24/2024.   All medical entries made by the scribe were at my, Dr. Roger Leon MD, direction and personally dictated by me on 01/24/2024. I have reviewed the chart and agree that the record accurately reflects my personal performance of the history, physical exam, assessment and plan. I have also personally directed, reviewed, and agreed with the chart.

## 2024-01-24 NOTE — HISTORY OF PRESENT ILLNESS
[de-identified] : Patient continues to have shoulder pain. Continues to undergo Physical therapy. Denies any UE weakness, numbness. [FreeTextEntry1] : Patient presents for follow-up.

## 2024-01-29 ENCOUNTER — LABORATORY RESULT (OUTPATIENT)
Age: 59
End: 2024-01-29

## 2024-01-29 ENCOUNTER — APPOINTMENT (OUTPATIENT)
Dept: INTERNAL MEDICINE | Facility: CLINIC | Age: 59
End: 2024-01-29
Payer: COMMERCIAL

## 2024-01-29 VITALS
TEMPERATURE: 97.9 F | DIASTOLIC BLOOD PRESSURE: 74 MMHG | WEIGHT: 165 LBS | HEIGHT: 61 IN | OXYGEN SATURATION: 98 % | BODY MASS INDEX: 31.15 KG/M2 | HEART RATE: 67 BPM | SYSTOLIC BLOOD PRESSURE: 122 MMHG

## 2024-01-29 DIAGNOSIS — Z00.00 ENCOUNTER FOR GENERAL ADULT MEDICAL EXAMINATION W/OUT ABNORMAL FINDINGS: ICD-10-CM

## 2024-01-29 DIAGNOSIS — G56.01 CARPAL TUNNEL SYNDROME, RIGHT UPPER LIMB: ICD-10-CM

## 2024-01-29 DIAGNOSIS — R05.9 COUGH, UNSPECIFIED: ICD-10-CM

## 2024-01-29 DIAGNOSIS — M75.80 OTHER SHOULDER LESIONS, UNSPECIFIED SHOULDER: ICD-10-CM

## 2024-01-29 PROCEDURE — 99213 OFFICE O/P EST LOW 20 MIN: CPT | Mod: 25

## 2024-01-29 PROCEDURE — 99396 PREV VISIT EST AGE 40-64: CPT | Mod: 25

## 2024-01-29 NOTE — HISTORY OF PRESENT ILLNESS
[FreeTextEntry1] : Patient presents for annual physical exam. [de-identified] : Shoulder pain has been improving with Lidocaine patches. She has been doing home physical therapy. She was sick about 1-month ago, went to ED and had imaging done. CXR was unremarkable. Continues to have some cough and throat discomfort. Denies any difficulty swallowing, hoarseness, wheezing. Denies any abdominal pain, urianry symptom or change in bowel habits.

## 2024-01-29 NOTE — ADDENDUM
[FreeTextEntry1] : I, Nehemias Ferreira, acted as a scribe on behalf of Dr. Roger Leon MD, on 01/29/2024.   All medical entries made by the scribe were at my, Dr. Roger Leon MD, direction and personally dictated by me on 01/29/2024. I have reviewed the chart and agree that the record accurately reflects my personal performance of the history, physical exam, assessment and plan. I have also personally directed, reviewed, and agreed with the chart.

## 2024-01-29 NOTE — ASSESSMENT
[FreeTextEntry1] : Annual Physical Exam -BP is stable. Continue current management. -Check A1c, lipid panel and Vitamin levels. Continue with home exercise program, lidocaine patches. -Advised antihistamine for cough. Discussed reflux percussions. -Continue with healthy diet and weight management -RTO annually or as needed

## 2024-01-29 NOTE — HEALTH RISK ASSESSMENT
[Good] : ~his/her~  mood as  good [FreeTextEntry1] : Health Maintenance,  [No] : In the past 12 months have you used drugs other than those required for medical reasons? No [de-identified] : Physical Therapy.

## 2024-02-03 LAB
25(OH)D3 SERPL-MCNC: 29.7 NG/ML
ALBUMIN SERPL ELPH-MCNC: 4.4 G/DL
ALP BLD-CCNC: 107 U/L
ALT SERPL-CCNC: 50 U/L
ANION GAP SERPL CALC-SCNC: 13 MMOL/L
APPEARANCE: CLEAR
AST SERPL-CCNC: 37 U/L
BILIRUB SERPL-MCNC: 1.4 MG/DL
BILIRUBIN URINE: NEGATIVE
BLOOD URINE: ABNORMAL
BUN SERPL-MCNC: 19 MG/DL
CALCIUM SERPL-MCNC: 9.3 MG/DL
CHLORIDE SERPL-SCNC: 104 MMOL/L
CHOLEST SERPL-MCNC: 156 MG/DL
CO2 SERPL-SCNC: 22 MMOL/L
COLOR: YELLOW
CREAT SERPL-MCNC: 0.63 MG/DL
EGFR: 102 ML/MIN/1.73M2
ESTIMATED AVERAGE GLUCOSE: 126 MG/DL
GLUCOSE QUALITATIVE U: NEGATIVE MG/DL
GLUCOSE SERPL-MCNC: 107 MG/DL
HBA1C MFR BLD HPLC: 6 %
HCT VFR BLD CALC: 38.9 %
HDLC SERPL-MCNC: 57 MG/DL
HGB BLD-MCNC: 12.8 G/DL
KETONES URINE: NEGATIVE MG/DL
LDLC SERPL CALC-MCNC: 84 MG/DL
LEUKOCYTE ESTERASE URINE: ABNORMAL
MCHC RBC-ENTMCNC: 30.1 PG
MCHC RBC-ENTMCNC: 32.9 GM/DL
MCV RBC AUTO: 91.5 FL
NITRITE URINE: NEGATIVE
NONHDLC SERPL-MCNC: 100 MG/DL
PH URINE: 5.5
PLATELET # BLD AUTO: 263 K/UL
POTASSIUM SERPL-SCNC: 4.4 MMOL/L
PROT SERPL-MCNC: 7.2 G/DL
PROTEIN URINE: NEGATIVE MG/DL
RBC # BLD: 4.25 M/UL
RBC # FLD: 13.8 %
SODIUM SERPL-SCNC: 140 MMOL/L
SPECIFIC GRAVITY URINE: 1.03
TRIGL SERPL-MCNC: 86 MG/DL
TSH SERPL-ACNC: 1.45 UIU/ML
UROBILINOGEN URINE: 0.2 MG/DL
VIT B12 SERPL-MCNC: >2000 PG/ML
WBC # FLD AUTO: 5.15 K/UL

## 2024-02-10 ENCOUNTER — RX RENEWAL (OUTPATIENT)
Age: 59
End: 2024-02-10

## 2024-02-10 RX ORDER — OMEPRAZOLE 40 MG/1
40 CAPSULE, DELAYED RELEASE ORAL
Qty: 90 | Refills: 1 | Status: ACTIVE | COMMUNITY
Start: 2020-08-27 | End: 1900-01-01

## 2024-03-11 ENCOUNTER — APPOINTMENT (OUTPATIENT)
Dept: INTERNAL MEDICINE | Facility: CLINIC | Age: 59
End: 2024-03-11
Payer: COMMERCIAL

## 2024-03-11 VITALS
BODY MASS INDEX: 31.53 KG/M2 | TEMPERATURE: 97.9 F | WEIGHT: 167 LBS | DIASTOLIC BLOOD PRESSURE: 68 MMHG | OXYGEN SATURATION: 96 % | SYSTOLIC BLOOD PRESSURE: 118 MMHG | HEIGHT: 61 IN | HEART RATE: 63 BPM

## 2024-03-11 DIAGNOSIS — R74.8 ABNORMAL LEVELS OF OTHER SERUM ENZYMES: ICD-10-CM

## 2024-03-11 DIAGNOSIS — H57.89 OTHER SPECIFIED DISORDERS OF EYE AND ADNEXA: ICD-10-CM

## 2024-03-11 DIAGNOSIS — M25.512 PAIN IN LEFT SHOULDER: ICD-10-CM

## 2024-03-11 PROCEDURE — 99214 OFFICE O/P EST MOD 30 MIN: CPT | Mod: 25

## 2024-03-12 NOTE — PHYSICAL EXAM
[Normal] : soft, non-tender, non-distended, no masses palpated, no HSM and normal bowel sounds [de-identified] : L shoulder Tenderness over coracoid process, difficulty PROM and AROM.  [de-identified] : No redness or crusting appreciated

## 2024-03-12 NOTE — INTERPRETER SERVICES
[[employer] ] : provided by [employer]   [Time Spent: ____ minutes] : Total time spent using  services: [unfilled] minutes. The patient's primary language is not English thus required  services. [Interpreters_IDNumber] : [id number] [Interpreters_FullName] : Blas [TWNoteComboBox1] : Ethiopian

## 2024-03-12 NOTE — ADDENDUM
[FreeTextEntry1] : I, Nehemias Ferreira, acted as a scribe on behalf of Dr. Roger Leon MD, on 03/12/2024.   All medical entries made by the scribe were at my, Dr. Roger Leon MD, direction and personally dictated by me on 03/12/2024. I have reviewed the chart and agree that the record accurately reflects my personal performance of the history, physical exam, assessment and plan. I have also personally directed, reviewed, and agreed with the chart.

## 2024-03-12 NOTE — ASSESSMENT
[FreeTextEntry1] : Blood work done to assess for elevated liver enzymes. No pathology seen on exam in regard to red eye. Advised Ophthalmology consult. Given the Shoulder pain, XR and US ordered. To assess for Rotator cuff tear. To start physical therapy.

## 2024-03-12 NOTE — HISTORY OF PRESENT ILLNESS
[FreeTextEntry1] : Patient presents for follow-up. [de-identified] : Patient presents for elevated Liver enzymes Denies any alcohol use, NSAID use, abd pain, flank pain. She does have L shoulder pain and has been using lidocaine patch. She has difficulty raising shoulder. Denies any neck pain, shoulder weakness. Gets occasional red eye. Denies any crusting, discharge, photophobia, or changes in vision.

## 2024-03-18 LAB
ALBUMIN SERPL ELPH-MCNC: 4.4 G/DL
ALP BLD-CCNC: 131 U/L
ALT SERPL-CCNC: 41 U/L
ANA SER IF-ACNC: NEGATIVE
AST SERPL-CCNC: 26 U/L
BILIRUB DIRECT SERPL-MCNC: 0.2 MG/DL
BILIRUB INDIRECT SERPL-MCNC: 0.5 MG/DL
BILIRUB SERPL-MCNC: 0.6 MG/DL
FERRITIN SERPL-MCNC: 146 NG/ML
GGT SERPL-CCNC: 20 U/L
HAV IGM SER QL: NONREACTIVE
HBV CORE IGG+IGM SER QL: NONREACTIVE
HBV SURFACE AB SER QL: NONREACTIVE
HBV SURFACE AG SER QL: NONREACTIVE
HCV AB SER QL: NONREACTIVE
HCV S/CO RATIO: 0.18 S/CO
HEPATITIS A IGG ANTIBODY: REACTIVE
IRON SATN MFR SERPL: 18 %
IRON SERPL-MCNC: 61 UG/DL
PROT SERPL-MCNC: 6.8 G/DL
TIBC SERPL-MCNC: 333 UG/DL
TTG IGA SER IA-ACNC: <1.2 U/ML
TTG IGA SER-ACNC: NEGATIVE
TTG IGG SER IA-ACNC: 3.9 U/ML
TTG IGG SER IA-ACNC: NEGATIVE
UIBC SERPL-MCNC: 271 UG/DL

## 2024-03-21 ENCOUNTER — OUTPATIENT (OUTPATIENT)
Dept: OUTPATIENT SERVICES | Facility: HOSPITAL | Age: 59
LOS: 1 days | End: 2024-03-21
Payer: COMMERCIAL

## 2024-03-21 ENCOUNTER — APPOINTMENT (OUTPATIENT)
Dept: RADIOLOGY | Facility: CLINIC | Age: 59
End: 2024-03-21
Payer: COMMERCIAL

## 2024-03-21 DIAGNOSIS — M25.512 PAIN IN LEFT SHOULDER: ICD-10-CM

## 2024-03-21 PROCEDURE — 73030 X-RAY EXAM OF SHOULDER: CPT | Mod: 26,LT

## 2024-03-21 PROCEDURE — 73030 X-RAY EXAM OF SHOULDER: CPT

## 2024-03-28 ENCOUNTER — RX RENEWAL (OUTPATIENT)
Age: 59
End: 2024-03-28

## 2024-03-28 RX ORDER — ATORVASTATIN CALCIUM 20 MG/1
20 TABLET, FILM COATED ORAL
Qty: 90 | Refills: 3 | Status: ACTIVE | COMMUNITY
Start: 2020-09-01 | End: 1900-01-01

## 2024-04-01 ENCOUNTER — APPOINTMENT (OUTPATIENT)
Dept: ULTRASOUND IMAGING | Facility: CLINIC | Age: 59
End: 2024-04-01
Payer: COMMERCIAL

## 2024-04-01 ENCOUNTER — OUTPATIENT (OUTPATIENT)
Dept: OUTPATIENT SERVICES | Facility: HOSPITAL | Age: 59
LOS: 1 days | End: 2024-04-01
Payer: COMMERCIAL

## 2024-04-01 DIAGNOSIS — M25.512 PAIN IN LEFT SHOULDER: ICD-10-CM

## 2024-04-01 PROCEDURE — 76882 US LMTD JT/FCL EVL NVASC XTR: CPT

## 2024-04-01 PROCEDURE — 76882 US LMTD JT/FCL EVL NVASC XTR: CPT | Mod: 26,LT

## 2024-06-25 NOTE — ED ADULT TRIAGE NOTE - BMI (KG/M2)
29.8 HPI    MANDY: 10/23/2017 Dr. Amaya   Chief complaint (CC): 82 yr old in clinic today for Annual Eye Exam   Glasses? Yes, Bifocal  Contacts? No  H/o eye surgery, injections or laser: Cataract Sx OU  H/o eye injury: No  Known eye conditions? No  Family h/o eye conditions? No  Eye gtts? No      (-) Flashes (-)  Floaters (-) Mucous   (-)  Tearing (-) Itching (-) Burning   (-) Headaches (-) Eye Pain/discomfort (-) Irritation   (-)  Redness (-) Double vision (-) Blurry vision    Diabetic? Yes  A1c? Lab Results       Component                Value               Date                       HGBA1C                   5.9 (H)             04/15/2024                  Last edited by Shelby Araiza on 6/25/2024 11:21 AM.            Assessment /Plan     For exam results, see Encounter Report.    Type 2 diabetes mellitus without retinopathy    Type 2 diabetes mellitus with diabetic nephropathy, without long-term current use of insulin    Presence of intraocular lens    Hyperopia with presbyopia of both eyes      MONITOR. ED PT ON ALL EXAM FINDINGS  OK TO CONTINUE WITH HABITUAL SPECS   TYPE 2 DM W/O RETINOPATHY OU; CONTINUE WITH PCP FOR GLYCEMIC CONTROL  S/P PCIOL OU; UV PROTECTION; MONITOR  RTC 1 YR//PRN FOR REE/DFE

## 2025-06-19 NOTE — PHYSICAL EXAM
Addended by: PRASANNA GUZMAN on: 6/19/2025 09:11 AM     Modules accepted: Level of Service     [de-identified] : Oriented to time, place, person\par Mood: Normal\par Affect: Normal\par Appearance: Healthy, well appearing, no acute distress.\par Gait: Normal\par Assistive Devices: None\par \par Right shoulder exam:\par \par Inspection: No malalignment, No defects, No atrophy\par Skin: No masses, No lesions\par Neck: Negative Spurling, full ROM, no pain with ROM\par AROM: FF to 70, abduction to 60, ER to 10, IR to hip\par Painful arc ROM: Pain FF pasive=active\par Tenderness: No bicipital tenderness, no tenderness to greater tuberosity/RTC insertion, no anterior shoulder/lesser tuberosity tenderness\par Strength: 5/5 ER, 5/5 IR in adduction, 5/5 supraspinatus testing, negative [place]'s test\par AC joint: No TTP/pain with cross arm testing\par Biceps: Speed Negative, Yergason Negative \par Impingement test: Negative Greene, Negative Neer\par Vasc: 2+ radial pulse \par Stability: Stable \par Neuro: AIN, PIN, Ulnar nerve intact to motor\par Sensation: Intact to light touch throughout  [de-identified] : Images were reviewed from NH dated 3.27.2021.\par \par 3 views of right shoulder were obtained, that show no acute fracture or dislocation. There is no glenohumeral and no AC joint degenerative change seen. Type II acromion. There is no significant malalignment. No significant other obvious osseous abnormality, otherwise unremarkable. \par \par MRI right shoulder dated 4.27.2021 shows mild to moderate insertional supraspinatus and infraspinatus tendinosis with no high-grade RTC injury.  No significant internal derangement.